# Patient Record
Sex: MALE | Race: WHITE | NOT HISPANIC OR LATINO | Employment: OTHER | URBAN - METROPOLITAN AREA
[De-identification: names, ages, dates, MRNs, and addresses within clinical notes are randomized per-mention and may not be internally consistent; named-entity substitution may affect disease eponyms.]

---

## 2024-01-01 ENCOUNTER — HOSPITAL ENCOUNTER (INPATIENT)
Facility: HOSPITAL | Age: 79
LOS: 14 days | End: 2024-07-22
Attending: EMERGENCY MEDICINE | Admitting: INTERNAL MEDICINE
Payer: COMMERCIAL

## 2024-01-01 VITALS
SYSTOLIC BLOOD PRESSURE: 108 MMHG | WEIGHT: 117.28 LBS | TEMPERATURE: 98.4 F | HEIGHT: 71 IN | BODY MASS INDEX: 16.42 KG/M2 | DIASTOLIC BLOOD PRESSURE: 52 MMHG

## 2024-01-01 DIAGNOSIS — F03.911 DEMENTIA WITH AGITATION, UNSPECIFIED DEMENTIA SEVERITY, UNSPECIFIED DEMENTIA TYPE: Primary | ICD-10-CM

## 2024-01-01 LAB
ALBUMIN SERPL-MCNC: 3.7 G/DL (ref 3.5–5.2)
ALBUMIN/GLOB SERPL: 1.4 G/DL
ALP SERPL-CCNC: 89 U/L (ref 39–117)
ALT SERPL W P-5'-P-CCNC: 20 U/L (ref 1–41)
ANION GAP SERPL CALCULATED.3IONS-SCNC: 10.9 MMOL/L (ref 5–15)
ANION GAP SERPL CALCULATED.3IONS-SCNC: 11 MMOL/L (ref 5–15)
AST SERPL-CCNC: 24 U/L (ref 1–40)
BASOPHILS # BLD AUTO: 0.02 10*3/MM3 (ref 0–0.2)
BASOPHILS NFR BLD AUTO: 0.3 % (ref 0–1.5)
BILIRUB SERPL-MCNC: 0.5 MG/DL (ref 0–1.2)
BILIRUB UR QL STRIP: NEGATIVE
BUN SERPL-MCNC: 20 MG/DL (ref 8–23)
BUN SERPL-MCNC: 24 MG/DL (ref 8–23)
BUN/CREAT SERPL: 25.6 (ref 7–25)
BUN/CREAT SERPL: 28.9 (ref 7–25)
CALCIUM SPEC-SCNC: 9.1 MG/DL (ref 8.6–10.5)
CALCIUM SPEC-SCNC: 9.7 MG/DL (ref 8.6–10.5)
CHLORIDE SERPL-SCNC: 103 MMOL/L (ref 98–107)
CHLORIDE SERPL-SCNC: 103 MMOL/L (ref 98–107)
CLARITY UR: CLEAR
CO2 SERPL-SCNC: 27.1 MMOL/L (ref 22–29)
CO2 SERPL-SCNC: 28 MMOL/L (ref 22–29)
COLOR UR: YELLOW
CREAT SERPL-MCNC: 0.78 MG/DL (ref 0.76–1.27)
CREAT SERPL-MCNC: 0.83 MG/DL (ref 0.76–1.27)
DEPRECATED RDW RBC AUTO: 42.3 FL (ref 37–54)
DEPRECATED RDW RBC AUTO: 42.7 FL (ref 37–54)
EGFRCR SERPLBLD CKD-EPI 2021: 87.9 ML/MIN/1.73
EGFRCR SERPLBLD CKD-EPI 2021: 89.6 ML/MIN/1.73
EOSINOPHIL # BLD AUTO: 0.06 10*3/MM3 (ref 0–0.4)
EOSINOPHIL NFR BLD AUTO: 1 % (ref 0.3–6.2)
ERYTHROCYTE [DISTWIDTH] IN BLOOD BY AUTOMATED COUNT: 12.2 % (ref 12.3–15.4)
ERYTHROCYTE [DISTWIDTH] IN BLOOD BY AUTOMATED COUNT: 12.3 % (ref 12.3–15.4)
GLOBULIN UR ELPH-MCNC: 2.7 GM/DL
GLUCOSE BLDC GLUCOMTR-MCNC: 94 MG/DL (ref 70–130)
GLUCOSE SERPL-MCNC: 77 MG/DL (ref 65–99)
GLUCOSE SERPL-MCNC: 80 MG/DL (ref 65–99)
GLUCOSE UR STRIP-MCNC: NEGATIVE MG/DL
HCT VFR BLD AUTO: 35.1 % (ref 37.5–51)
HCT VFR BLD AUTO: 39.1 % (ref 37.5–51)
HGB BLD-MCNC: 11.7 G/DL (ref 13–17.7)
HGB BLD-MCNC: 13.1 G/DL (ref 13–17.7)
HGB UR QL STRIP.AUTO: NEGATIVE
IMM GRANULOCYTES # BLD AUTO: 0.02 10*3/MM3 (ref 0–0.05)
IMM GRANULOCYTES NFR BLD AUTO: 0.3 % (ref 0–0.5)
KETONES UR QL STRIP: NEGATIVE
LEUKOCYTE ESTERASE UR QL STRIP.AUTO: NEGATIVE
LYMPHOCYTES # BLD AUTO: 1.41 10*3/MM3 (ref 0.7–3.1)
LYMPHOCYTES NFR BLD AUTO: 23.3 % (ref 19.6–45.3)
MCH RBC QN AUTO: 31.5 PG (ref 26.6–33)
MCH RBC QN AUTO: 31.6 PG (ref 26.6–33)
MCHC RBC AUTO-ENTMCNC: 33.3 G/DL (ref 31.5–35.7)
MCHC RBC AUTO-ENTMCNC: 33.5 G/DL (ref 31.5–35.7)
MCV RBC AUTO: 94.4 FL (ref 79–97)
MCV RBC AUTO: 94.6 FL (ref 79–97)
MONOCYTES # BLD AUTO: 0.49 10*3/MM3 (ref 0.1–0.9)
MONOCYTES NFR BLD AUTO: 8.1 % (ref 5–12)
NEUTROPHILS NFR BLD AUTO: 4.06 10*3/MM3 (ref 1.7–7)
NEUTROPHILS NFR BLD AUTO: 67 % (ref 42.7–76)
NITRITE UR QL STRIP: NEGATIVE
NRBC BLD AUTO-RTO: 0 /100 WBC (ref 0–0.2)
PH UR STRIP.AUTO: 7 [PH] (ref 5–8)
PLATELET # BLD AUTO: 234 10*3/MM3 (ref 140–450)
PLATELET # BLD AUTO: 239 10*3/MM3 (ref 140–450)
PMV BLD AUTO: 8.9 FL (ref 6–12)
PMV BLD AUTO: 9 FL (ref 6–12)
POTASSIUM SERPL-SCNC: 4 MMOL/L (ref 3.5–5.2)
POTASSIUM SERPL-SCNC: 4.2 MMOL/L (ref 3.5–5.2)
PROT SERPL-MCNC: 6.4 G/DL (ref 6–8.5)
PROT UR QL STRIP: ABNORMAL
RBC # BLD AUTO: 3.71 10*6/MM3 (ref 4.14–5.8)
RBC # BLD AUTO: 4.14 10*6/MM3 (ref 4.14–5.8)
SODIUM SERPL-SCNC: 141 MMOL/L (ref 136–145)
SODIUM SERPL-SCNC: 142 MMOL/L (ref 136–145)
SP GR UR STRIP: 1.01 (ref 1–1.03)
UROBILINOGEN UR QL STRIP: ABNORMAL
WBC NRBC COR # BLD AUTO: 5.74 10*3/MM3 (ref 3.4–10.8)
WBC NRBC COR # BLD AUTO: 6.06 10*3/MM3 (ref 3.4–10.8)

## 2024-01-01 PROCEDURE — 96372 THER/PROPH/DIAG INJ SC/IM: CPT

## 2024-01-01 PROCEDURE — 25010000002 KETOROLAC TROMETHAMINE PER 15 MG: Performed by: INTERNAL MEDICINE

## 2024-01-01 PROCEDURE — 82948 REAGENT STRIP/BLOOD GLUCOSE: CPT

## 2024-01-01 PROCEDURE — 25010000002 LORAZEPAM PER 2 MG: Performed by: INTERNAL MEDICINE

## 2024-01-01 PROCEDURE — 25010000002 OLANZAPINE 10 MG RECONSTITUTED SOLUTION: Performed by: SPECIALIST

## 2024-01-01 PROCEDURE — 99222 1ST HOSP IP/OBS MODERATE 55: CPT | Performed by: INTERNAL MEDICINE

## 2024-01-01 PROCEDURE — 80053 COMPREHEN METABOLIC PANEL: CPT | Performed by: EMERGENCY MEDICINE

## 2024-01-01 PROCEDURE — 25010000002 GLYCOPYRROLATE 0.2 MG/ML SOLUTION: Performed by: INTERNAL MEDICINE

## 2024-01-01 PROCEDURE — 80048 BASIC METABOLIC PNL TOTAL CA: CPT | Performed by: NURSE PRACTITIONER

## 2024-01-01 PROCEDURE — 85027 COMPLETE CBC AUTOMATED: CPT | Performed by: NURSE PRACTITIONER

## 2024-01-01 PROCEDURE — 25010000002 HYDROMORPHONE 1 MG/ML SOLUTION: Performed by: INTERNAL MEDICINE

## 2024-01-01 PROCEDURE — 25010000002 MORPHINE PER 10 MG: Performed by: INTERNAL MEDICINE

## 2024-01-01 PROCEDURE — 99285 EMERGENCY DEPT VISIT HI MDM: CPT

## 2024-01-01 PROCEDURE — 99231 SBSQ HOSP IP/OBS SF/LOW 25: CPT | Performed by: INTERNAL MEDICINE

## 2024-01-01 PROCEDURE — 25010000002 HALOPERIDOL LACTATE PER 5 MG: Performed by: INTERNAL MEDICINE

## 2024-01-01 PROCEDURE — 25010000002 OLANZAPINE 10 MG RECONSTITUTED SOLUTION: Performed by: EMERGENCY MEDICINE

## 2024-01-01 PROCEDURE — 81003 URINALYSIS AUTO W/O SCOPE: CPT | Performed by: EMERGENCY MEDICINE

## 2024-01-01 PROCEDURE — 85025 COMPLETE CBC W/AUTO DIFF WBC: CPT | Performed by: EMERGENCY MEDICINE

## 2024-01-01 PROCEDURE — G0378 HOSPITAL OBSERVATION PER HR: HCPCS

## 2024-01-01 PROCEDURE — 25010000002 HYDROMORPHONE PER 4 MG: Performed by: INTERNAL MEDICINE

## 2024-01-01 RX ORDER — MORPHINE SULFATE 2 MG/ML
2 INJECTION, SOLUTION INTRAMUSCULAR; INTRAVENOUS
Status: DISCONTINUED | OUTPATIENT
Start: 2024-01-01 | End: 2024-01-01

## 2024-01-01 RX ORDER — HALOPERIDOL 2 MG/1
2 TABLET ORAL EVERY 4 HOURS PRN
Status: DISCONTINUED | OUTPATIENT
Start: 2024-01-01 | End: 2024-01-01

## 2024-01-01 RX ORDER — ACETAMINOPHEN 325 MG/1
650 TABLET ORAL EVERY 4 HOURS PRN
Status: DISCONTINUED | OUTPATIENT
Start: 2024-01-01 | End: 2024-01-01

## 2024-01-01 RX ORDER — HALOPERIDOL 2 MG/ML
2 SOLUTION ORAL EVERY 4 HOURS PRN
Status: DISCONTINUED | OUTPATIENT
Start: 2024-01-01 | End: 2024-01-01

## 2024-01-01 RX ORDER — HALOPERIDOL 5 MG/ML
1 INJECTION INTRAMUSCULAR ONCE
Status: COMPLETED | OUTPATIENT
Start: 2024-01-01 | End: 2024-01-01

## 2024-01-01 RX ORDER — CITALOPRAM HYDROBROMIDE 10 MG/1
10 TABLET ORAL DAILY
Status: DISCONTINUED | OUTPATIENT
Start: 2024-01-01 | End: 2024-01-01

## 2024-01-01 RX ORDER — LORAZEPAM 2 MG/ML
1 CONCENTRATE ORAL
Status: DISCONTINUED | OUTPATIENT
Start: 2024-01-01 | End: 2024-01-01

## 2024-01-01 RX ORDER — SENNOSIDES A AND B 8.6 MG/1
2 TABLET, FILM COATED ORAL DAILY
COMMUNITY

## 2024-01-01 RX ORDER — LORAZEPAM 2 MG/ML
1 CONCENTRATE ORAL
Status: DISCONTINUED | OUTPATIENT
Start: 2024-01-01 | End: 2024-01-01 | Stop reason: HOSPADM

## 2024-01-01 RX ORDER — LISINOPRIL 20 MG/1
20 TABLET ORAL DAILY
COMMUNITY

## 2024-01-01 RX ORDER — LIDOCAINE HYDROCHLORIDE 20 MG/ML
JELLY TOPICAL ONCE
Status: COMPLETED | OUTPATIENT
Start: 2024-01-01 | End: 2024-01-01

## 2024-01-01 RX ORDER — LORAZEPAM 2 MG/ML
0.5 CONCENTRATE ORAL
Status: DISCONTINUED | OUTPATIENT
Start: 2024-01-01 | End: 2024-01-01 | Stop reason: HOSPADM

## 2024-01-01 RX ORDER — MORPHINE SULFATE 10 MG/ML
6 INJECTION INTRAMUSCULAR; INTRAVENOUS; SUBCUTANEOUS
Status: DISCONTINUED | OUTPATIENT
Start: 2024-01-01 | End: 2024-01-01 | Stop reason: HOSPADM

## 2024-01-01 RX ORDER — LORAZEPAM 2 MG/ML
2 CONCENTRATE ORAL
Status: DISCONTINUED | OUTPATIENT
Start: 2024-01-01 | End: 2024-01-01

## 2024-01-01 RX ORDER — ONDANSETRON 2 MG/ML
4 INJECTION INTRAMUSCULAR; INTRAVENOUS EVERY 6 HOURS PRN
Status: DISCONTINUED | OUTPATIENT
Start: 2024-01-01 | End: 2024-01-01 | Stop reason: HOSPADM

## 2024-01-01 RX ORDER — MORPHINE SULFATE 20 MG/ML
20 SOLUTION ORAL
Status: DISCONTINUED | OUTPATIENT
Start: 2024-01-01 | End: 2024-01-01

## 2024-01-01 RX ORDER — LORAZEPAM 2 MG/ML
0.5 INJECTION INTRAMUSCULAR
Status: DISCONTINUED | OUTPATIENT
Start: 2024-01-01 | End: 2024-01-01 | Stop reason: HOSPADM

## 2024-01-01 RX ORDER — MIRTAZAPINE 15 MG/1
15 TABLET, ORALLY DISINTEGRATING ORAL NIGHTLY
Status: DISCONTINUED | OUTPATIENT
Start: 2024-01-01 | End: 2024-01-01

## 2024-01-01 RX ORDER — OLANZAPINE 2.5 MG/1
2.5 TABLET, FILM COATED ORAL 2 TIMES DAILY
Status: DISCONTINUED | OUTPATIENT
Start: 2024-01-01 | End: 2024-01-01

## 2024-01-01 RX ORDER — MORPHINE SULFATE 2 MG/ML
2 INJECTION, SOLUTION INTRAMUSCULAR; INTRAVENOUS EVERY 4 HOURS PRN
Status: DISCONTINUED | OUTPATIENT
Start: 2024-01-01 | End: 2024-01-01

## 2024-01-01 RX ORDER — CALCIUM CARBONATE 500 MG/1
2 TABLET, CHEWABLE ORAL 2 TIMES DAILY PRN
Status: DISCONTINUED | OUTPATIENT
Start: 2024-01-01 | End: 2024-01-01

## 2024-01-01 RX ORDER — ACETAMINOPHEN 160 MG/5ML
650 SOLUTION ORAL EVERY 4 HOURS PRN
Status: DISCONTINUED | OUTPATIENT
Start: 2024-01-01 | End: 2024-01-01

## 2024-01-01 RX ORDER — SODIUM CHLORIDE 0.9 % (FLUSH) 0.9 %
10 SYRINGE (ML) INJECTION EVERY 12 HOURS SCHEDULED
Status: DISCONTINUED | OUTPATIENT
Start: 2024-01-01 | End: 2024-01-01

## 2024-01-01 RX ORDER — HALOPERIDOL 1 MG/1
1 TABLET ORAL EVERY 4 HOURS PRN
Status: DISCONTINUED | OUTPATIENT
Start: 2024-01-01 | End: 2024-01-01

## 2024-01-01 RX ORDER — HYDROMORPHONE HYDROCHLORIDE 1 MG/ML
0.5 INJECTION, SOLUTION INTRAMUSCULAR; INTRAVENOUS; SUBCUTANEOUS
Status: DISCONTINUED | OUTPATIENT
Start: 2024-01-01 | End: 2024-01-01 | Stop reason: HOSPADM

## 2024-01-01 RX ORDER — LORAZEPAM 0.5 MG/1
0.5 TABLET ORAL NIGHTLY
COMMUNITY

## 2024-01-01 RX ORDER — HALOPERIDOL 2 MG/ML
1 SOLUTION ORAL EVERY 4 HOURS PRN
Status: DISCONTINUED | OUTPATIENT
Start: 2024-01-01 | End: 2024-01-01

## 2024-01-01 RX ORDER — TAMSULOSIN HYDROCHLORIDE 0.4 MG/1
0.4 CAPSULE ORAL DAILY
Status: DISCONTINUED | OUTPATIENT
Start: 2024-01-01 | End: 2024-01-01

## 2024-01-01 RX ORDER — GLYCOPYRROLATE 0.2 MG/ML
0.2 INJECTION INTRAMUSCULAR; INTRAVENOUS
Status: DISCONTINUED | OUTPATIENT
Start: 2024-01-01 | End: 2024-01-01 | Stop reason: HOSPADM

## 2024-01-01 RX ORDER — KETOROLAC TROMETHAMINE 15 MG/ML
15 INJECTION, SOLUTION INTRAMUSCULAR; INTRAVENOUS EVERY 6 HOURS PRN
Status: DISCONTINUED | OUTPATIENT
Start: 2024-01-01 | End: 2024-01-01

## 2024-01-01 RX ORDER — MORPHINE SULFATE 4 MG/ML
4 INJECTION, SOLUTION INTRAMUSCULAR; INTRAVENOUS
Status: DISCONTINUED | OUTPATIENT
Start: 2024-01-01 | End: 2024-01-01 | Stop reason: HOSPADM

## 2024-01-01 RX ORDER — CEFDINIR 300 MG/1
300 CAPSULE ORAL 2 TIMES DAILY
COMMUNITY

## 2024-01-01 RX ORDER — DIPHENOXYLATE HYDROCHLORIDE AND ATROPINE SULFATE 2.5; .025 MG/1; MG/1
1 TABLET ORAL
Status: DISCONTINUED | OUTPATIENT
Start: 2024-01-01 | End: 2024-01-01

## 2024-01-01 RX ORDER — MORPHINE SULFATE 20 MG/ML
5 SOLUTION ORAL
Status: DISCONTINUED | OUTPATIENT
Start: 2024-01-01 | End: 2024-01-01 | Stop reason: HOSPADM

## 2024-01-01 RX ORDER — LORAZEPAM 2 MG/ML
2 INJECTION INTRAMUSCULAR
Status: DISCONTINUED | OUTPATIENT
Start: 2024-01-01 | End: 2024-01-01 | Stop reason: HOSPADM

## 2024-01-01 RX ORDER — LORAZEPAM 2 MG/ML
2 CONCENTRATE ORAL
Status: DISCONTINUED | OUTPATIENT
Start: 2024-01-01 | End: 2024-01-01 | Stop reason: HOSPADM

## 2024-01-01 RX ORDER — OLANZAPINE 10 MG/2ML
5 INJECTION, POWDER, FOR SOLUTION INTRAMUSCULAR EVERY 8 HOURS PRN
Status: DISCONTINUED | OUTPATIENT
Start: 2024-01-01 | End: 2024-01-01

## 2024-01-01 RX ORDER — BISACODYL 10 MG
10 SUPPOSITORY, RECTAL RECTAL DAILY PRN
Status: DISCONTINUED | OUTPATIENT
Start: 2024-01-01 | End: 2024-01-01 | Stop reason: HOSPADM

## 2024-01-01 RX ORDER — DIVALPROEX SODIUM 125 MG/1
250 CAPSULE, COATED PELLETS ORAL 3 TIMES DAILY
Status: DISCONTINUED | OUTPATIENT
Start: 2024-01-01 | End: 2024-01-01

## 2024-01-01 RX ORDER — ONDANSETRON 4 MG/1
4 TABLET, ORALLY DISINTEGRATING ORAL EVERY 6 HOURS PRN
Status: DISCONTINUED | OUTPATIENT
Start: 2024-01-01 | End: 2024-01-01

## 2024-01-01 RX ORDER — LORAZEPAM 2 MG/ML
0.5 CONCENTRATE ORAL
Status: DISCONTINUED | OUTPATIENT
Start: 2024-01-01 | End: 2024-01-01

## 2024-01-01 RX ORDER — GLYCOPYRROLATE 0.2 MG/ML
0.2 INJECTION INTRAMUSCULAR; INTRAVENOUS
Status: DISCONTINUED | OUTPATIENT
Start: 2024-01-01 | End: 2024-01-01

## 2024-01-01 RX ORDER — MORPHINE SULFATE 2 MG/ML
2 INJECTION, SOLUTION INTRAMUSCULAR; INTRAVENOUS
Status: DISCONTINUED | OUTPATIENT
Start: 2024-01-01 | End: 2024-01-01 | Stop reason: HOSPADM

## 2024-01-01 RX ORDER — SODIUM CHLORIDE 0.9 % (FLUSH) 0.9 %
10 SYRINGE (ML) INJECTION AS NEEDED
Status: DISCONTINUED | OUTPATIENT
Start: 2024-01-01 | End: 2024-01-01 | Stop reason: HOSPADM

## 2024-01-01 RX ORDER — ACETAMINOPHEN 650 MG/1
650 SUPPOSITORY RECTAL EVERY 4 HOURS PRN
Status: DISCONTINUED | OUTPATIENT
Start: 2024-01-01 | End: 2024-01-01 | Stop reason: HOSPADM

## 2024-01-01 RX ORDER — HYDROCODONE BITARTRATE AND ACETAMINOPHEN 5; 325 MG/1; MG/1
1 TABLET ORAL EVERY 4 HOURS PRN
Status: DISCONTINUED | OUTPATIENT
Start: 2024-01-01 | End: 2024-01-01

## 2024-01-01 RX ORDER — LORAZEPAM 0.5 MG/1
0.5 TABLET ORAL NIGHTLY
Status: DISCONTINUED | OUTPATIENT
Start: 2024-01-01 | End: 2024-01-01

## 2024-01-01 RX ORDER — LORAZEPAM 0.5 MG/1
0.5 TABLET ORAL 3 TIMES DAILY
Status: DISCONTINUED | OUTPATIENT
Start: 2024-01-01 | End: 2024-01-01

## 2024-01-01 RX ORDER — MORPHINE SULFATE 10 MG/ML
6 INJECTION INTRAMUSCULAR; INTRAVENOUS; SUBCUTANEOUS
Status: DISCONTINUED | OUTPATIENT
Start: 2024-01-01 | End: 2024-01-01

## 2024-01-01 RX ORDER — LORAZEPAM 0.5 MG/1
0.5 TABLET ORAL
Status: DISCONTINUED | OUTPATIENT
Start: 2024-01-01 | End: 2024-01-01

## 2024-01-01 RX ORDER — DIVALPROEX SODIUM 125 MG/1
125 CAPSULE, COATED PELLETS ORAL 3 TIMES DAILY
Status: DISCONTINUED | OUTPATIENT
Start: 2024-01-01 | End: 2024-01-01

## 2024-01-01 RX ORDER — LORAZEPAM 2 MG/ML
0.5 INJECTION INTRAMUSCULAR
Status: DISCONTINUED | OUTPATIENT
Start: 2024-01-01 | End: 2024-01-01

## 2024-01-01 RX ORDER — SODIUM CHLORIDE 9 MG/ML
40 INJECTION, SOLUTION INTRAVENOUS AS NEEDED
Status: DISCONTINUED | OUTPATIENT
Start: 2024-01-01 | End: 2024-01-01

## 2024-01-01 RX ORDER — MORPHINE SULFATE 20 MG/ML
10 SOLUTION ORAL
Status: DISCONTINUED | OUTPATIENT
Start: 2024-01-01 | End: 2024-01-01

## 2024-01-01 RX ORDER — HYDROMORPHONE HYDROCHLORIDE 2 MG/ML
1.5 INJECTION, SOLUTION INTRAMUSCULAR; INTRAVENOUS; SUBCUTANEOUS
Status: DISCONTINUED | OUTPATIENT
Start: 2024-01-01 | End: 2024-01-01 | Stop reason: HOSPADM

## 2024-01-01 RX ORDER — LORAZEPAM 2 MG/ML
1 INJECTION INTRAMUSCULAR
Status: DISCONTINUED | OUTPATIENT
Start: 2024-01-01 | End: 2024-01-01

## 2024-01-01 RX ORDER — MORPHINE SULFATE 20 MG/ML
20 SOLUTION ORAL
Status: DISCONTINUED | OUTPATIENT
Start: 2024-01-01 | End: 2024-01-01 | Stop reason: HOSPADM

## 2024-01-01 RX ORDER — GLYCOPYRROLATE 0.2 MG/ML
0.4 INJECTION INTRAMUSCULAR; INTRAVENOUS
Status: DISCONTINUED | OUTPATIENT
Start: 2024-01-01 | End: 2024-01-01

## 2024-01-01 RX ORDER — MEMANTINE HYDROCHLORIDE 5 MG/1
5 TABLET ORAL 2 TIMES DAILY
Status: DISCONTINUED | OUTPATIENT
Start: 2024-01-01 | End: 2024-01-01

## 2024-01-01 RX ORDER — LORAZEPAM 2 MG/ML
2 INJECTION INTRAMUSCULAR
Status: DISCONTINUED | OUTPATIENT
Start: 2024-01-01 | End: 2024-01-01

## 2024-01-01 RX ORDER — HYDROMORPHONE HYDROCHLORIDE 1 MG/ML
0.5 INJECTION, SOLUTION INTRAMUSCULAR; INTRAVENOUS; SUBCUTANEOUS
Status: DISCONTINUED | OUTPATIENT
Start: 2024-01-01 | End: 2024-01-01

## 2024-01-01 RX ORDER — POLYETHYLENE GLYCOL 3350 17 G/17G
17 POWDER, FOR SOLUTION ORAL DAILY PRN
Status: DISCONTINUED | OUTPATIENT
Start: 2024-01-01 | End: 2024-01-01

## 2024-01-01 RX ORDER — BISACODYL 5 MG/1
5 TABLET, DELAYED RELEASE ORAL DAILY PRN
Status: DISCONTINUED | OUTPATIENT
Start: 2024-01-01 | End: 2024-01-01

## 2024-01-01 RX ORDER — LISINOPRIL 20 MG/1
20 TABLET ORAL DAILY
Status: DISCONTINUED | OUTPATIENT
Start: 2024-01-01 | End: 2024-01-01

## 2024-01-01 RX ORDER — OLANZAPINE 10 MG/2ML
2.5 INJECTION, POWDER, FOR SOLUTION INTRAMUSCULAR ONCE
Status: COMPLETED | OUTPATIENT
Start: 2024-01-01 | End: 2024-01-01

## 2024-01-01 RX ORDER — HYDROMORPHONE HYDROCHLORIDE 2 MG/ML
1.5 INJECTION, SOLUTION INTRAMUSCULAR; INTRAVENOUS; SUBCUTANEOUS
Status: DISCONTINUED | OUTPATIENT
Start: 2024-01-01 | End: 2024-01-01

## 2024-01-01 RX ORDER — MORPHINE SULFATE 20 MG/ML
5 SOLUTION ORAL
Status: DISCONTINUED | OUTPATIENT
Start: 2024-01-01 | End: 2024-01-01

## 2024-01-01 RX ORDER — MEMANTINE HYDROCHLORIDE 5 MG/1
5 TABLET ORAL 2 TIMES DAILY
COMMUNITY

## 2024-01-01 RX ORDER — GLYCOPYRROLATE 0.2 MG/ML
0.4 INJECTION INTRAMUSCULAR; INTRAVENOUS
Status: DISCONTINUED | OUTPATIENT
Start: 2024-01-01 | End: 2024-01-01 | Stop reason: HOSPADM

## 2024-01-01 RX ORDER — MORPHINE SULFATE 4 MG/ML
4 INJECTION, SOLUTION INTRAMUSCULAR; INTRAVENOUS
Status: DISCONTINUED | OUTPATIENT
Start: 2024-01-01 | End: 2024-01-01

## 2024-01-01 RX ORDER — CEFDINIR 300 MG/1
300 CAPSULE ORAL 2 TIMES DAILY
Status: DISCONTINUED | OUTPATIENT
Start: 2024-01-01 | End: 2024-01-01

## 2024-01-01 RX ORDER — HALOPERIDOL 5 MG/ML
1 INJECTION INTRAMUSCULAR EVERY 4 HOURS PRN
Status: DISCONTINUED | OUTPATIENT
Start: 2024-01-01 | End: 2024-01-01

## 2024-01-01 RX ORDER — ACETAMINOPHEN 650 MG/1
650 SUPPOSITORY RECTAL EVERY 4 HOURS PRN
Status: DISCONTINUED | OUTPATIENT
Start: 2024-01-01 | End: 2024-01-01

## 2024-01-01 RX ORDER — HALOPERIDOL 5 MG/ML
2 INJECTION INTRAMUSCULAR EVERY 4 HOURS PRN
Status: DISCONTINUED | OUTPATIENT
Start: 2024-01-01 | End: 2024-01-01

## 2024-01-01 RX ORDER — MORPHINE SULFATE 20 MG/ML
10 SOLUTION ORAL
Status: DISCONTINUED | OUTPATIENT
Start: 2024-01-01 | End: 2024-01-01 | Stop reason: HOSPADM

## 2024-01-01 RX ORDER — MIRTAZAPINE 15 MG/1
15 TABLET, ORALLY DISINTEGRATING ORAL NIGHTLY
COMMUNITY

## 2024-01-01 RX ORDER — CITALOPRAM HYDROBROMIDE 10 MG/1
10 TABLET ORAL DAILY
COMMUNITY

## 2024-01-01 RX ORDER — AMOXICILLIN 250 MG
2 CAPSULE ORAL 2 TIMES DAILY PRN
Status: DISCONTINUED | OUTPATIENT
Start: 2024-01-01 | End: 2024-01-01

## 2024-01-01 RX ORDER — LORAZEPAM 2 MG/ML
1 INJECTION INTRAMUSCULAR
Status: DISCONTINUED | OUTPATIENT
Start: 2024-01-01 | End: 2024-01-01 | Stop reason: HOSPADM

## 2024-01-01 RX ADMIN — LORAZEPAM 0.5 MG: 0.5 TABLET ORAL at 21:26

## 2024-01-01 RX ADMIN — CEFDINIR 300 MG: 300 CAPSULE ORAL at 09:12

## 2024-01-01 RX ADMIN — LORAZEPAM 1 MG: 2 INJECTION INTRAMUSCULAR; INTRAVENOUS at 00:50

## 2024-01-01 RX ADMIN — OLANZAPINE 5 MG: 10 INJECTION, POWDER, FOR SOLUTION INTRAMUSCULAR at 00:12

## 2024-01-01 RX ADMIN — GLYCOPYRROLATE 0.2 MG: 0.2 INJECTION INTRAMUSCULAR; INTRAVENOUS at 21:17

## 2024-01-01 RX ADMIN — MIRTAZAPINE 15 MG: 15 TABLET, ORALLY DISINTEGRATING ORAL at 20:47

## 2024-01-01 RX ADMIN — KETOROLAC TROMETHAMINE 15 MG: 15 INJECTION, SOLUTION INTRAMUSCULAR; INTRAVENOUS at 11:51

## 2024-01-01 RX ADMIN — LORAZEPAM 2 MG: 2 INJECTION INTRAMUSCULAR; INTRAVENOUS at 14:49

## 2024-01-01 RX ADMIN — LORAZEPAM 0.5 MG: 0.5 TABLET ORAL at 23:00

## 2024-01-01 RX ADMIN — CEFDINIR 300 MG: 300 CAPSULE ORAL at 21:21

## 2024-01-01 RX ADMIN — DIVALPROEX SODIUM 250 MG: 125 CAPSULE, COATED PELLETS ORAL at 20:44

## 2024-01-01 RX ADMIN — Medication 10 ML: at 21:26

## 2024-01-01 RX ADMIN — OLANZAPINE 2.5 MG: 2.5 TABLET, FILM COATED ORAL at 22:43

## 2024-01-01 RX ADMIN — CEFDINIR 300 MG: 300 CAPSULE ORAL at 20:47

## 2024-01-01 RX ADMIN — CITALOPRAM 10 MG: 10 TABLET, FILM COATED ORAL at 08:45

## 2024-01-01 RX ADMIN — Medication 10 ML: at 09:20

## 2024-01-01 RX ADMIN — MORPHINE SULFATE 4 MG: 4 INJECTION, SOLUTION INTRAMUSCULAR; INTRAVENOUS at 12:34

## 2024-01-01 RX ADMIN — MEMANTINE HYDROCHLORIDE 5 MG: 5 TABLET, FILM COATED ORAL at 21:26

## 2024-01-01 RX ADMIN — HYDROMORPHONE HYDROCHLORIDE 1 MG: 1 INJECTION, SOLUTION INTRAMUSCULAR; INTRAVENOUS; SUBCUTANEOUS at 21:17

## 2024-01-01 RX ADMIN — LORAZEPAM 1 MG: 2 INJECTION INTRAMUSCULAR; INTRAVENOUS at 04:58

## 2024-01-01 RX ADMIN — DIVALPROEX SODIUM 250 MG: 125 CAPSULE, COATED PELLETS ORAL at 22:43

## 2024-01-01 RX ADMIN — MIRTAZAPINE 15 MG: 15 TABLET, ORALLY DISINTEGRATING ORAL at 21:22

## 2024-01-01 RX ADMIN — MEMANTINE HYDROCHLORIDE 5 MG: 5 TABLET, FILM COATED ORAL at 20:47

## 2024-01-01 RX ADMIN — MORPHINE SULFATE 4 MG: 4 INJECTION, SOLUTION INTRAMUSCULAR; INTRAVENOUS at 17:27

## 2024-01-01 RX ADMIN — OLANZAPINE 2.5 MG: 2.5 TABLET, FILM COATED ORAL at 20:45

## 2024-01-01 RX ADMIN — GLYCOPYRROLATE 0.2 MG: 0.2 INJECTION INTRAMUSCULAR; INTRAVENOUS at 01:07

## 2024-01-01 RX ADMIN — DIVALPROEX SODIUM 250 MG: 125 CAPSULE, COATED PELLETS ORAL at 21:26

## 2024-01-01 RX ADMIN — LISINOPRIL 20 MG: 20 TABLET ORAL at 08:34

## 2024-01-01 RX ADMIN — OLANZAPINE 2.5 MG: 2.5 TABLET, FILM COATED ORAL at 09:14

## 2024-01-01 RX ADMIN — HYDROMORPHONE HYDROCHLORIDE 1 MG: 1 INJECTION, SOLUTION INTRAMUSCULAR; INTRAVENOUS; SUBCUTANEOUS at 01:07

## 2024-01-01 RX ADMIN — DIVALPROEX SODIUM 250 MG: 125 CAPSULE, COATED PELLETS ORAL at 15:20

## 2024-01-01 RX ADMIN — LORAZEPAM 0.5 MG: 0.5 TABLET ORAL at 10:38

## 2024-01-01 RX ADMIN — KETOROLAC TROMETHAMINE 15 MG: 15 INJECTION, SOLUTION INTRAMUSCULAR; INTRAVENOUS at 23:10

## 2024-01-01 RX ADMIN — LORAZEPAM 2 MG: 2 INJECTION INTRAMUSCULAR; INTRAVENOUS at 12:11

## 2024-01-01 RX ADMIN — MORPHINE SULFATE 2 MG: 2 INJECTION, SOLUTION INTRAMUSCULAR; INTRAVENOUS at 17:47

## 2024-01-01 RX ADMIN — Medication 10 ML: at 09:13

## 2024-01-01 RX ADMIN — LORAZEPAM 0.5 MG: 2 INJECTION INTRAMUSCULAR; INTRAVENOUS at 21:59

## 2024-01-01 RX ADMIN — OLANZAPINE 2.5 MG: 2.5 TABLET, FILM COATED ORAL at 15:07

## 2024-01-01 RX ADMIN — LORAZEPAM 0.5 MG: 0.5 TABLET ORAL at 16:35

## 2024-01-01 RX ADMIN — OLANZAPINE 2.5 MG: 10 INJECTION, POWDER, FOR SOLUTION INTRAMUSCULAR at 19:05

## 2024-01-01 RX ADMIN — Medication 10 ML: at 18:40

## 2024-01-01 RX ADMIN — MEMANTINE HYDROCHLORIDE 5 MG: 5 TABLET, FILM COATED ORAL at 22:56

## 2024-01-01 RX ADMIN — MORPHINE SULFATE 2 MG: 2 INJECTION, SOLUTION INTRAMUSCULAR; INTRAVENOUS at 15:38

## 2024-01-01 RX ADMIN — TAMSULOSIN HYDROCHLORIDE 0.4 MG: 0.4 CAPSULE ORAL at 09:12

## 2024-01-01 RX ADMIN — MEMANTINE HYDROCHLORIDE 5 MG: 5 TABLET, FILM COATED ORAL at 08:34

## 2024-01-01 RX ADMIN — MIRTAZAPINE 15 MG: 15 TABLET, ORALLY DISINTEGRATING ORAL at 22:59

## 2024-01-01 RX ADMIN — Medication 10 ML: at 20:47

## 2024-01-01 RX ADMIN — MORPHINE SULFATE 2 MG: 2 INJECTION, SOLUTION INTRAMUSCULAR; INTRAVENOUS at 13:10

## 2024-01-01 RX ADMIN — MORPHINE SULFATE 2 MG: 2 INJECTION, SOLUTION INTRAMUSCULAR; INTRAVENOUS at 18:33

## 2024-01-01 RX ADMIN — MORPHINE SULFATE 2 MG: 2 INJECTION, SOLUTION INTRAMUSCULAR; INTRAVENOUS at 16:57

## 2024-01-01 RX ADMIN — LORAZEPAM 1 MG: 2 INJECTION INTRAMUSCULAR; INTRAVENOUS at 08:20

## 2024-01-01 RX ADMIN — MIRTAZAPINE 15 MG: 15 TABLET, ORALLY DISINTEGRATING ORAL at 22:43

## 2024-01-01 RX ADMIN — MORPHINE SULFATE 2 MG: 2 INJECTION, SOLUTION INTRAMUSCULAR; INTRAVENOUS at 19:34

## 2024-01-01 RX ADMIN — GLYCOPYRROLATE 0.2 MG: 0.2 INJECTION INTRAMUSCULAR; INTRAVENOUS at 00:43

## 2024-01-01 RX ADMIN — MIRTAZAPINE 15 MG: 15 TABLET, ORALLY DISINTEGRATING ORAL at 02:12

## 2024-01-01 RX ADMIN — OLANZAPINE 5 MG: 10 INJECTION, POWDER, FOR SOLUTION INTRAMUSCULAR at 14:57

## 2024-01-01 RX ADMIN — DIVALPROEX SODIUM 250 MG: 125 CAPSULE, COATED PELLETS ORAL at 10:38

## 2024-01-01 RX ADMIN — HALOPERIDOL LACTATE 1 MG: 5 INJECTION, SOLUTION INTRAMUSCULAR at 21:34

## 2024-01-01 RX ADMIN — LORAZEPAM 0.5 MG: 0.5 TABLET ORAL at 20:45

## 2024-01-01 RX ADMIN — LORAZEPAM 0.5 MG: 0.5 TABLET ORAL at 20:47

## 2024-01-01 RX ADMIN — GLYCOPYRROLATE 0.2 MG: 0.2 INJECTION INTRAMUSCULAR; INTRAVENOUS at 18:33

## 2024-01-01 RX ADMIN — MORPHINE SULFATE 2 MG: 2 INJECTION, SOLUTION INTRAMUSCULAR; INTRAVENOUS at 04:47

## 2024-01-01 RX ADMIN — LORAZEPAM 0.5 MG: 2 INJECTION INTRAMUSCULAR; INTRAVENOUS at 16:58

## 2024-01-01 RX ADMIN — HYDROMORPHONE HYDROCHLORIDE 1 MG: 1 INJECTION, SOLUTION INTRAMUSCULAR; INTRAVENOUS; SUBCUTANEOUS at 18:39

## 2024-01-01 RX ADMIN — DIVALPROEX SODIUM 250 MG: 125 CAPSULE, COATED PELLETS ORAL at 16:35

## 2024-01-01 RX ADMIN — OLANZAPINE 2.5 MG: 2.5 TABLET, FILM COATED ORAL at 23:00

## 2024-01-01 RX ADMIN — CITALOPRAM 10 MG: 10 TABLET, FILM COATED ORAL at 09:20

## 2024-01-01 RX ADMIN — LORAZEPAM 0.5 MG: 0.5 TABLET ORAL at 02:12

## 2024-01-01 RX ADMIN — LORAZEPAM 0.5 MG: 2 INJECTION INTRAMUSCULAR; INTRAVENOUS at 22:57

## 2024-01-01 RX ADMIN — LORAZEPAM 2 MG: 2 INJECTION INTRAMUSCULAR; INTRAVENOUS at 09:52

## 2024-01-01 RX ADMIN — LORAZEPAM 1 MG: 2 INJECTION INTRAMUSCULAR; INTRAVENOUS at 19:34

## 2024-01-01 RX ADMIN — HYDROCODONE BITARTRATE AND ACETAMINOPHEN 1 TABLET: 5; 325 TABLET ORAL at 22:56

## 2024-01-01 RX ADMIN — LORAZEPAM 1 MG: 2 INJECTION INTRAMUSCULAR; INTRAVENOUS at 12:34

## 2024-01-01 RX ADMIN — LISINOPRIL 20 MG: 20 TABLET ORAL at 09:12

## 2024-01-01 RX ADMIN — GLYCOPYRROLATE 0.2 MG: 0.2 INJECTION INTRAMUSCULAR; INTRAVENOUS at 18:39

## 2024-01-01 RX ADMIN — LORAZEPAM 0.5 MG: 2 INJECTION INTRAMUSCULAR; INTRAVENOUS at 18:42

## 2024-01-01 RX ADMIN — MEMANTINE HYDROCHLORIDE 5 MG: 5 TABLET, FILM COATED ORAL at 08:45

## 2024-01-01 RX ADMIN — OLANZAPINE 2.5 MG: 2.5 TABLET, FILM COATED ORAL at 10:38

## 2024-01-01 RX ADMIN — LORAZEPAM 0.5 MG: 0.5 TABLET ORAL at 08:34

## 2024-01-01 RX ADMIN — GLYCOPYRROLATE 0.2 MG: 0.2 INJECTION INTRAMUSCULAR; INTRAVENOUS at 09:52

## 2024-01-01 RX ADMIN — LORAZEPAM 0.5 MG: 0.5 TABLET ORAL at 17:05

## 2024-01-01 RX ADMIN — CITALOPRAM 10 MG: 10 TABLET, FILM COATED ORAL at 08:34

## 2024-01-01 RX ADMIN — GLYCOPYRROLATE 0.2 MG: 0.2 INJECTION INTRAMUSCULAR; INTRAVENOUS at 12:34

## 2024-01-01 RX ADMIN — DIVALPROEX SODIUM 250 MG: 125 CAPSULE, COATED PELLETS ORAL at 08:35

## 2024-01-01 RX ADMIN — KETOROLAC TROMETHAMINE 15 MG: 15 INJECTION, SOLUTION INTRAMUSCULAR; INTRAVENOUS at 18:41

## 2024-01-01 RX ADMIN — LORAZEPAM 1 MG: 2 INJECTION INTRAMUSCULAR; INTRAVENOUS at 00:44

## 2024-01-01 RX ADMIN — OLANZAPINE 2.5 MG: 2.5 TABLET, FILM COATED ORAL at 08:35

## 2024-01-01 RX ADMIN — DIVALPROEX SODIUM 125 MG: 125 CAPSULE, COATED PELLETS ORAL at 20:47

## 2024-01-01 RX ADMIN — MORPHINE SULFATE 4 MG: 4 INJECTION, SOLUTION INTRAMUSCULAR; INTRAVENOUS at 19:26

## 2024-01-01 RX ADMIN — DIVALPROEX SODIUM 125 MG: 125 CAPSULE, COATED PELLETS ORAL at 15:00

## 2024-01-01 RX ADMIN — DIVALPROEX SODIUM 250 MG: 125 CAPSULE, COATED PELLETS ORAL at 09:14

## 2024-01-01 RX ADMIN — LISINOPRIL 20 MG: 20 TABLET ORAL at 09:20

## 2024-01-01 RX ADMIN — TAMSULOSIN HYDROCHLORIDE 0.4 MG: 0.4 CAPSULE ORAL at 20:47

## 2024-01-01 RX ADMIN — CEFDINIR 300 MG: 300 CAPSULE ORAL at 02:12

## 2024-01-01 RX ADMIN — Medication 10 ML: at 08:47

## 2024-01-01 RX ADMIN — LORAZEPAM 0.5 MG: 2 INJECTION INTRAMUSCULAR; INTRAVENOUS at 11:51

## 2024-01-01 RX ADMIN — HYDROCODONE BITARTRATE AND ACETAMINOPHEN 1 TABLET: 5; 325 TABLET ORAL at 21:26

## 2024-01-01 RX ADMIN — GLYCOPYRROLATE 0.2 MG: 0.2 INJECTION INTRAMUSCULAR; INTRAVENOUS at 14:49

## 2024-01-01 RX ADMIN — HYDROCODONE BITARTRATE AND ACETAMINOPHEN 1 TABLET: 5; 325 TABLET ORAL at 08:34

## 2024-01-01 RX ADMIN — CEFDINIR 300 MG: 300 CAPSULE ORAL at 08:45

## 2024-01-01 RX ADMIN — ACETAMINOPHEN 325MG 650 MG: 325 TABLET ORAL at 15:23

## 2024-01-01 RX ADMIN — MORPHINE SULFATE 2 MG: 2 INJECTION, SOLUTION INTRAMUSCULAR; INTRAVENOUS at 00:50

## 2024-01-01 RX ADMIN — LORAZEPAM 1 MG: 2 INJECTION INTRAMUSCULAR; INTRAVENOUS at 17:27

## 2024-01-01 RX ADMIN — MORPHINE SULFATE 2 MG: 2 INJECTION, SOLUTION INTRAMUSCULAR; INTRAVENOUS at 13:03

## 2024-01-01 RX ADMIN — MORPHINE SULFATE 2 MG: 2 INJECTION, SOLUTION INTRAMUSCULAR; INTRAVENOUS at 14:30

## 2024-01-01 RX ADMIN — DIVALPROEX SODIUM 250 MG: 125 CAPSULE, COATED PELLETS ORAL at 15:07

## 2024-01-01 RX ADMIN — DIVALPROEX SODIUM 250 MG: 125 CAPSULE, COATED PELLETS ORAL at 16:01

## 2024-01-01 RX ADMIN — HYDROMORPHONE HYDROCHLORIDE 1 MG: 1 INJECTION, SOLUTION INTRAMUSCULAR; INTRAVENOUS; SUBCUTANEOUS at 14:49

## 2024-01-01 RX ADMIN — LISINOPRIL 20 MG: 20 TABLET ORAL at 08:45

## 2024-01-01 RX ADMIN — LORAZEPAM 1 MG: 2 INJECTION INTRAMUSCULAR; INTRAVENOUS at 19:25

## 2024-01-01 RX ADMIN — CITALOPRAM 10 MG: 10 TABLET, FILM COATED ORAL at 09:12

## 2024-01-01 RX ADMIN — MORPHINE SULFATE 4 MG: 4 INJECTION, SOLUTION INTRAMUSCULAR; INTRAVENOUS at 00:44

## 2024-01-01 RX ADMIN — LORAZEPAM 0.5 MG: 2 INJECTION INTRAMUSCULAR; INTRAVENOUS at 09:02

## 2024-01-01 RX ADMIN — LORAZEPAM 0.5 MG: 2 INJECTION INTRAMUSCULAR; INTRAVENOUS at 13:10

## 2024-01-01 RX ADMIN — LORAZEPAM 1 MG: 2 INJECTION INTRAMUSCULAR; INTRAVENOUS at 15:37

## 2024-01-01 RX ADMIN — LORAZEPAM 1 MG: 2 INJECTION INTRAMUSCULAR; INTRAVENOUS at 18:34

## 2024-01-01 RX ADMIN — LORAZEPAM 1 MG: 2 INJECTION INTRAMUSCULAR; INTRAVENOUS at 14:30

## 2024-01-01 RX ADMIN — LORAZEPAM 1 MG: 2 INJECTION INTRAMUSCULAR; INTRAVENOUS at 13:03

## 2024-01-01 RX ADMIN — HYDROMORPHONE HYDROCHLORIDE 0.5 MG: 1 INJECTION, SOLUTION INTRAMUSCULAR; INTRAVENOUS; SUBCUTANEOUS at 22:57

## 2024-01-01 RX ADMIN — LORAZEPAM 0.5 MG: 0.5 TABLET ORAL at 21:22

## 2024-01-01 RX ADMIN — HYDROMORPHONE HYDROCHLORIDE 1 MG: 1 INJECTION, SOLUTION INTRAMUSCULAR; INTRAVENOUS; SUBCUTANEOUS at 12:11

## 2024-01-01 RX ADMIN — LORAZEPAM 0.5 MG: 0.5 TABLET ORAL at 22:43

## 2024-01-01 RX ADMIN — HYDROCODONE BITARTRATE AND ACETAMINOPHEN 1 TABLET: 5; 325 TABLET ORAL at 18:12

## 2024-01-01 RX ADMIN — OLANZAPINE 2.5 MG: 10 INJECTION, POWDER, FOR SOLUTION INTRAMUSCULAR at 17:46

## 2024-01-01 RX ADMIN — GLYCOPYRROLATE 0.2 MG: 0.2 INJECTION INTRAMUSCULAR; INTRAVENOUS at 04:58

## 2024-01-01 RX ADMIN — DIVALPROEX SODIUM 125 MG: 125 CAPSULE, COATED PELLETS ORAL at 08:45

## 2024-01-01 RX ADMIN — ACETAMINOPHEN 325MG 650 MG: 325 TABLET ORAL at 08:45

## 2024-01-01 RX ADMIN — TAMSULOSIN HYDROCHLORIDE 0.4 MG: 0.4 CAPSULE ORAL at 08:34

## 2024-01-01 RX ADMIN — MORPHINE SULFATE 4 MG: 4 INJECTION, SOLUTION INTRAMUSCULAR; INTRAVENOUS at 04:58

## 2024-01-01 RX ADMIN — LORAZEPAM 1 MG: 2 INJECTION INTRAMUSCULAR; INTRAVENOUS at 12:00

## 2024-01-01 RX ADMIN — MEMANTINE HYDROCHLORIDE 5 MG: 5 TABLET, FILM COATED ORAL at 02:12

## 2024-01-01 RX ADMIN — Medication 10 ML: at 08:36

## 2024-01-01 RX ADMIN — OLANZAPINE 2.5 MG: 2.5 TABLET, FILM COATED ORAL at 21:26

## 2024-01-01 RX ADMIN — LIDOCAINE HYDROCHLORIDE: 20 JELLY TOPICAL at 17:00

## 2024-01-01 RX ADMIN — Medication 10 ML: at 14:50

## 2024-01-01 RX ADMIN — LORAZEPAM 2 MG: 2 INJECTION INTRAMUSCULAR; INTRAVENOUS at 01:07

## 2024-01-01 RX ADMIN — LORAZEPAM 0.5 MG: 0.5 TABLET ORAL at 15:07

## 2024-01-01 RX ADMIN — LORAZEPAM 0.5 MG: 2 INJECTION INTRAMUSCULAR; INTRAVENOUS at 20:01

## 2024-01-01 RX ADMIN — MORPHINE SULFATE 2 MG: 2 INJECTION, SOLUTION INTRAMUSCULAR; INTRAVENOUS at 17:16

## 2024-01-01 RX ADMIN — GLYCOPYRROLATE 0.2 MG: 0.2 INJECTION INTRAMUSCULAR; INTRAVENOUS at 07:42

## 2024-01-01 RX ADMIN — DIVALPROEX SODIUM 250 MG: 125 CAPSULE, COATED PELLETS ORAL at 17:06

## 2024-01-01 RX ADMIN — Medication 10 ML: at 02:17

## 2024-01-01 RX ADMIN — LORAZEPAM 1 MG: 2 INJECTION INTRAMUSCULAR; INTRAVENOUS at 03:29

## 2024-01-01 RX ADMIN — MORPHINE SULFATE 2 MG: 2 INJECTION, SOLUTION INTRAMUSCULAR; INTRAVENOUS at 17:01

## 2024-01-01 RX ADMIN — Medication 10 ML: at 21:21

## 2024-01-01 RX ADMIN — LORAZEPAM 1 MG: 2 INJECTION INTRAMUSCULAR; INTRAVENOUS at 17:01

## 2024-01-01 RX ADMIN — ACETAMINOPHEN 325MG 650 MG: 325 TABLET ORAL at 02:12

## 2024-01-01 RX ADMIN — OLANZAPINE 5 MG: 10 INJECTION, POWDER, FOR SOLUTION INTRAMUSCULAR at 16:52

## 2024-01-01 RX ADMIN — CEFDINIR 300 MG: 300 CAPSULE ORAL at 09:20

## 2024-01-01 RX ADMIN — GLYCOPYRROLATE 0.2 MG: 0.2 INJECTION INTRAMUSCULAR; INTRAVENOUS at 17:27

## 2024-01-01 RX ADMIN — LORAZEPAM 2 MG: 2 INJECTION INTRAMUSCULAR; INTRAVENOUS at 21:17

## 2024-01-01 RX ADMIN — MORPHINE SULFATE 2 MG: 2 INJECTION, SOLUTION INTRAMUSCULAR; INTRAVENOUS at 03:30

## 2024-01-01 RX ADMIN — MIRTAZAPINE 15 MG: 15 TABLET, ORALLY DISINTEGRATING ORAL at 20:44

## 2024-01-01 RX ADMIN — HYDROMORPHONE HYDROCHLORIDE 1 MG: 1 INJECTION, SOLUTION INTRAMUSCULAR; INTRAVENOUS; SUBCUTANEOUS at 09:52

## 2024-01-01 RX ADMIN — ACETAMINOPHEN 325MG 650 MG: 325 TABLET ORAL at 20:47

## 2024-01-01 RX ADMIN — GLYCOPYRROLATE 0.2 MG: 0.2 INJECTION INTRAMUSCULAR; INTRAVENOUS at 12:11

## 2024-01-01 RX ADMIN — DIVALPROEX SODIUM 250 MG: 125 CAPSULE, COATED PELLETS ORAL at 21:21

## 2024-01-01 RX ADMIN — MIRTAZAPINE 15 MG: 15 TABLET, ORALLY DISINTEGRATING ORAL at 21:26

## 2024-01-01 RX ADMIN — LORAZEPAM 0.5 MG: 0.5 TABLET ORAL at 09:14

## 2024-01-01 RX ADMIN — MEMANTINE HYDROCHLORIDE 5 MG: 5 TABLET, FILM COATED ORAL at 09:12

## 2024-01-01 RX ADMIN — HYDROMORPHONE HYDROCHLORIDE 0.5 MG: 1 INJECTION, SOLUTION INTRAMUSCULAR; INTRAVENOUS; SUBCUTANEOUS at 17:29

## 2024-01-01 RX ADMIN — KETOROLAC TROMETHAMINE 15 MG: 15 INJECTION, SOLUTION INTRAMUSCULAR; INTRAVENOUS at 09:02

## 2024-01-01 RX ADMIN — DIVALPROEX SODIUM 250 MG: 125 CAPSULE, COATED PELLETS ORAL at 09:13

## 2024-01-01 RX ADMIN — MEMANTINE HYDROCHLORIDE 5 MG: 5 TABLET, FILM COATED ORAL at 09:20

## 2024-01-01 RX ADMIN — LORAZEPAM 2 MG: 2 INJECTION INTRAMUSCULAR; INTRAVENOUS at 18:39

## 2024-01-01 RX ADMIN — LORAZEPAM 0.5 MG: 0.5 TABLET ORAL at 16:01

## 2024-01-01 RX ADMIN — DIVALPROEX SODIUM 250 MG: 125 CAPSULE, COATED PELLETS ORAL at 23:00

## 2024-07-08 PROBLEM — F03.911 DEMENTIA WITH AGITATION: Status: ACTIVE | Noted: 2024-01-01

## 2024-07-08 NOTE — NURSING NOTE
ATTENTION ALL PROVIDERS: THIS PT IS A CURRENT HOSPICE PT WITH OUR HOME TEAM SERVICES. PLEASE CALL OUR CUSTOMER SUPPORT WITH ANY TREATMENT PLAN QUESTIONS AND/OR IF PT IS ADMITTED TO THE HOSPITAL. IF ADMITTED, HE WILL BE FOLLOWED BY OUR SCATTERED BED TEAM.     GUICHO GRAY RN  SCATTERED BED TEAM  [phone]

## 2024-07-08 NOTE — ED PROVIDER NOTES
EMERGENCY DEPARTMENT ENCOUNTER    Room Number:  P488/1  PCP: Provider, No Known  Historian:, EMS, family at bedside    I initially evaluated the patient at 1909    HPI:  Chief Complaint: Agitation, dementia  A complete HPI/ROS/PMH/PSH/SH/FH are unobtainable due to: Dementia  Context: Ac Green is a 79 y.o. male with a medical history of dementia who presents to the ED from home with reports of acute agitation.  Patient has advanced dementia.  He lives at home with his wife.  His wife is having difficulty caring for him.  Family has been trying to get him into a memory care facility.  He is currently under [facility].  Family reports he has been getting more more agitated and uncooperative recently.  He has had several falls.  He last fell 3 days ago and was seen at another ED.  CTs of the head and C-spine were negative acute at that time.  Per family, patient is not eating or drinking as much is normal.  He has not had a fever, cough, or vomiting.  He is currently on cefdinir for a UTI.            PAST MEDICAL HISTORY  Active Ambulatory Problems     Diagnosis Date Noted    No Active Ambulatory Problems     Resolved Ambulatory Problems     Diagnosis Date Noted    No Resolved Ambulatory Problems     Past Medical History:   Diagnosis Date    Hypertension     Sleep apnea          PAST SURGICAL HISTORY  Past Surgical History:   Procedure Laterality Date    PROSTATE SURGERY  2022         FAMILY HISTORY  History reviewed. No pertinent family history.      SOCIAL HISTORY  Social History     Socioeconomic History    Marital status:    Tobacco Use    Smoking status: Never     Passive exposure: Never    Smokeless tobacco: Never   Vaping Use    Vaping status: Never Used   Substance and Sexual Activity    Alcohol use: Never    Drug use: Never    Sexual activity: Defer         ALLERGIES  Patient has no known allergies.    REVIEW OF SYSTEMS  Review of Systems  Unobtainable    PHYSICAL EXAM  ED Triage Vitals [07/08/24  1658]   Temp Heart Rate Resp BP SpO2   98.2 °F (36.8 °C) 86 18 126/84 95 %      Temp src Heart Rate Source Patient Position BP Location FiO2 (%)   -- -- -- -- --       Physical Exam      GENERAL: Awake and alert.  Patient is intermittently agitated and uncooperative.  Frail elderly male.  Does not appear overtly toxic.  No acute distress  HENT: nares patent, there is a laceration on the posterior scalp with staples in place, no evidence of infection  EYES: PERRL  CV: regular rhythm, normal rate  RESPIRATORY: normal effort, clear to auscultation bilaterally  ABDOMEN: soft  MUSCULOSKELETAL: Extremities are nontender with full range of motion.  C/T/L-spine, chest, and pelvis are nontender  NEURO: Follows simple commands.  Moves all extremities.  No facial droop  PSYCH: Intermittently agitated  SKIN: warm, dry    Vital signs and nursing notes reviewed.          LAB RESULTS  Recent Results (from the past 24 hour(s))   Urinalysis With Microscopic If Indicated (No Culture) - Urine, Clean Catch    Collection Time: 07/08/24  8:05 PM    Specimen: Urine, Clean Catch   Result Value Ref Range    Color, UA Yellow Yellow, Straw    Appearance, UA Clear Clear    pH, UA 7.0 5.0 - 8.0    Specific Gravity, UA 1.013 1.005 - 1.030    Glucose, UA Negative Negative    Ketones, UA Negative Negative    Bilirubin, UA Negative Negative    Blood, UA Negative Negative    Protein, UA Trace (A) Negative    Leuk Esterase, UA Negative Negative    Nitrite, UA Negative Negative    Urobilinogen, UA 1.0 E.U./dL 0.2 - 1.0 E.U./dL   Comprehensive Metabolic Panel    Collection Time: 07/08/24 10:26 PM    Specimen: Blood   Result Value Ref Range    Glucose 80 65 - 99 mg/dL    BUN 24 (H) 8 - 23 mg/dL    Creatinine 0.83 0.76 - 1.27 mg/dL    Sodium 142 136 - 145 mmol/L    Potassium 4.2 3.5 - 5.2 mmol/L    Chloride 103 98 - 107 mmol/L    CO2 28.0 22.0 - 29.0 mmol/L    Calcium 9.7 8.6 - 10.5 mg/dL    Total Protein 6.4 6.0 - 8.5 g/dL    Albumin 3.7 3.5 - 5.2 g/dL     ALT (SGPT) 20 1 - 41 U/L    AST (SGOT) 24 1 - 40 U/L    Alkaline Phosphatase 89 39 - 117 U/L    Total Bilirubin 0.5 0.0 - 1.2 mg/dL    Globulin 2.7 gm/dL    A/G Ratio 1.4 g/dL    BUN/Creatinine Ratio 28.9 (H) 7.0 - 25.0    Anion Gap 11.0 5.0 - 15.0 mmol/L    eGFR 87.9 >60.0 mL/min/1.73   CBC Auto Differential    Collection Time: 07/08/24 10:26 PM    Specimen: Blood   Result Value Ref Range    WBC 6.06 3.40 - 10.80 10*3/mm3    RBC 4.14 4.14 - 5.80 10*6/mm3    Hemoglobin 13.1 13.0 - 17.7 g/dL    Hematocrit 39.1 37.5 - 51.0 %    MCV 94.4 79.0 - 97.0 fL    MCH 31.6 26.6 - 33.0 pg    MCHC 33.5 31.5 - 35.7 g/dL    RDW 12.3 12.3 - 15.4 %    RDW-SD 42.7 37.0 - 54.0 fl    MPV 8.9 6.0 - 12.0 fL    Platelets 239 140 - 450 10*3/mm3    Neutrophil % 67.0 42.7 - 76.0 %    Lymphocyte % 23.3 19.6 - 45.3 %    Monocyte % 8.1 5.0 - 12.0 %    Eosinophil % 1.0 0.3 - 6.2 %    Basophil % 0.3 0.0 - 1.5 %    Immature Grans % 0.3 0.0 - 0.5 %    Neutrophils, Absolute 4.06 1.70 - 7.00 10*3/mm3    Lymphocytes, Absolute 1.41 0.70 - 3.10 10*3/mm3    Monocytes, Absolute 0.49 0.10 - 0.90 10*3/mm3    Eosinophils, Absolute 0.06 0.00 - 0.40 10*3/mm3    Basophils, Absolute 0.02 0.00 - 0.20 10*3/mm3    Immature Grans, Absolute 0.02 0.00 - 0.05 10*3/mm3    nRBC 0.0 0.0 - 0.2 /100 WBC   Basic Metabolic Panel    Collection Time: 07/09/24  6:18 AM    Specimen: Blood   Result Value Ref Range    Glucose 77 65 - 99 mg/dL    BUN 20 8 - 23 mg/dL    Creatinine 0.78 0.76 - 1.27 mg/dL    Sodium 141 136 - 145 mmol/L    Potassium 4.0 3.5 - 5.2 mmol/L    Chloride 103 98 - 107 mmol/L    CO2 27.1 22.0 - 29.0 mmol/L    Calcium 9.1 8.6 - 10.5 mg/dL    BUN/Creatinine Ratio 25.6 (H) 7.0 - 25.0    Anion Gap 10.9 5.0 - 15.0 mmol/L    eGFR 89.6 >60.0 mL/min/1.73   CBC (No Diff)    Collection Time: 07/09/24  6:18 AM    Specimen: Blood   Result Value Ref Range    WBC 5.74 3.40 - 10.80 10*3/mm3    RBC 3.71 (L) 4.14 - 5.80 10*6/mm3    Hemoglobin 11.7 (L) 13.0 - 17.7 g/dL     Hematocrit 35.1 (L) 37.5 - 51.0 %    MCV 94.6 79.0 - 97.0 fL    MCH 31.5 26.6 - 33.0 pg    MCHC 33.3 31.5 - 35.7 g/dL    RDW 12.2 (L) 12.3 - 15.4 %    RDW-SD 42.3 37.0 - 54.0 fl    MPV 9.0 6.0 - 12.0 fL    Platelets 234 140 - 450 10*3/mm3       Ordered the above labs and reviewed the results.        RADIOLOGY  No Radiology Exams Resulted Within Past 24 Hours    Ordered the above noted radiological studies. Reviewed by me in PACS.            PROCEDURES  Procedures      OUTPATIENT MEDICATION MANAGEMENT:  Current Facility-Administered Medications Ordered in Epic   Medication Dose Route Frequency Provider Last Rate Last Admin    acetaminophen (TYLENOL) tablet 650 mg  650 mg Oral Q4H PRN Gail Lux APRN   650 mg at 07/09/24 0212    Or    acetaminophen (TYLENOL) 160 MG/5ML oral solution 650 mg  650 mg Oral Q4H PRN Gail Lux APRN        Or    acetaminophen (TYLENOL) suppository 650 mg  650 mg Rectal Q4H PRN Gail Lux APRN        sennosides-docusate (PERICOLACE) 8.6-50 MG per tablet 2 tablet  2 tablet Oral BID PRN Gail Lux APRN        And    polyethylene glycol (MIRALAX) packet 17 g  17 g Oral Daily PRN Gail Lux APRN        And    bisacodyl (DULCOLAX) EC tablet 5 mg  5 mg Oral Daily PRN Gail Lux APRN        And    bisacodyl (DULCOLAX) suppository 10 mg  10 mg Rectal Daily PRN Gail Lux APRN        calcium carbonate (TUMS) chewable tablet 500 mg (200 mg elemental)  2 tablet Oral BID PRN Gail Lux APRN        cefdinir (OMNICEF) capsule 300 mg  300 mg Oral BID Gail Lux APRN   300 mg at 07/09/24 0920    citalopram (CeleXA) tablet 10 mg  10 mg Oral Daily Gail Lux APRN   10 mg at 07/09/24 0920    Divalproex Sodium (DEPAKOTE SPRINKLE) capsule 125 mg  125 mg Oral TID Bhaskar Duron III, MD   125 mg at 07/09/24 1500    Lidocaine HCl gel (XYLOCAINE) urethral/mucosal syringe   Topical Once Colvin,  TOMMY Zaragoza        lisinopril (PRINIVIL,ZESTRIL) tablet 20 mg  20 mg Oral Daily Gail Lux APRN   20 mg at 07/09/24 0920    LORazepam (ATIVAN) tablet 0.5 mg  0.5 mg Oral Nightly Gail Lux APRN   0.5 mg at 07/09/24 0212    memantine (NAMENDA) tablet 5 mg  5 mg Oral BID Gail Lux APRN   5 mg at 07/09/24 0920    mirtazapine (REMERON SOL-TAB) disintegrating tablet 15 mg  15 mg Oral Nightly Gail Lux APRN   15 mg at 07/09/24 0212    morphine injection 2 mg  2 mg Intravenous Q4H PRN Viktor Tejada MD   2 mg at 07/09/24 1747    OLANZapine (zyPREXA) injection 5 mg  5 mg Intramuscular Q8H PRN Bhaskar Duron III, MD   5 mg at 07/09/24 1457    ondansetron ODT (ZOFRAN-ODT) disintegrating tablet 4 mg  4 mg Oral Q6H PRN Gail Lux APRN        Or    ondansetron (ZOFRAN) injection 4 mg  4 mg Intravenous Q6H PRN Gail Lux APRN        sodium chloride 0.9 % flush 10 mL  10 mL Intravenous Q12H Gail Lux APRN   10 mL at 07/09/24 0920    sodium chloride 0.9 % flush 10 mL  10 mL Intravenous PRN Gial Lux APRN        sodium chloride 0.9 % infusion 40 mL  40 mL Intravenous PRN Gail Lux APRN         No current [facility]-ordered outpatient medications on file.           MEDICATIONS GIVEN IN ER  Medications   sodium chloride 0.9 % flush 10 mL (10 mL Intravenous Given 7/9/24 0920)   sodium chloride 0.9 % flush 10 mL (has no administration in time range)   sodium chloride 0.9 % infusion 40 mL (has no administration in time range)   acetaminophen (TYLENOL) tablet 650 mg (650 mg Oral Given 7/9/24 0212)     Or   acetaminophen (TYLENOL) 160 MG/5ML oral solution 650 mg ( Oral Not Given:  See Alt 7/9/24 0212)     Or   acetaminophen (TYLENOL) suppository 650 mg ( Rectal Not Given:  See Alt 7/9/24 0212)   sennosides-docusate (PERICOLACE) 8.6-50 MG per tablet 2 tablet (has no administration in time range)     And   polyethylene glycol  (MIRALAX) packet 17 g (has no administration in time range)     And   bisacodyl (DULCOLAX) EC tablet 5 mg (has no administration in time range)     And   bisacodyl (DULCOLAX) suppository 10 mg (has no administration in time range)   ondansetron ODT (ZOFRAN-ODT) disintegrating tablet 4 mg (has no administration in time range)     Or   ondansetron (ZOFRAN) injection 4 mg (has no administration in time range)   calcium carbonate (TUMS) chewable tablet 500 mg (200 mg elemental) (has no administration in time range)   cefdinir (OMNICEF) capsule 300 mg (300 mg Oral Given 7/9/24 0920)   citalopram (CeleXA) tablet 10 mg (10 mg Oral Given 7/9/24 0920)   lisinopril (PRINIVIL,ZESTRIL) tablet 20 mg (20 mg Oral Given 7/9/24 0920)   LORazepam (ATIVAN) tablet 0.5 mg (0.5 mg Oral Given 7/9/24 0212)   memantine (NAMENDA) tablet 5 mg (5 mg Oral Given 7/9/24 0920)   mirtazapine (REMERON SOL-TAB) disintegrating tablet 15 mg (15 mg Oral Given 7/9/24 0212)   Divalproex Sodium (DEPAKOTE SPRINKLE) capsule 125 mg (125 mg Oral Given 7/9/24 1500)   OLANZapine (zyPREXA) injection 5 mg (5 mg Intramuscular Given 7/9/24 1457)   Lidocaine HCl gel (XYLOCAINE) urethral/mucosal syringe (has no administration in time range)   morphine injection 2 mg (2 mg Intravenous Given 7/9/24 1747)   OLANZapine (zyPREXA) injection 2.5 mg (2.5 mg Intramuscular Given 7/8/24 1746)   OLANZapine (zyPREXA) injection 2.5 mg (2.5 mg Intramuscular Given 7/8/24 1905)   haloperidol lactate (HALDOL) injection 1 mg (1 mg Intramuscular Given 7/8/24 2134)                   MEDICAL DECISION MAKING, PROGRESS, and CONSULTS    All labs have been independently reviewed by me.  All radiology studies have been reviewed by me and I have also reviewed the radiology report.   EKG's independently viewed and interpreted by me.  Discussion below represents my analysis of pertinent findings related to patient's condition, differential diagnosis, treatment plan and final  disposition.      Additional sources:    - Discussed/ obtained information from independent historians: EMS, spouse and daughter at bedside    - External (non-ED) record review: CT abdomen/pelvis done on 6/29/2024 showed a large stool ball in the rectum.  There was circumferential bladder wall thickening.  Patient does not have any prior admissions here.    -Prescription drug monitoring program review:     N/A    - Chronic or social conditions impacting patient care (Social Determinants of Health): None          Orders placed during this visit:  Orders Placed This Encounter   Procedures    Comprehensive Metabolic Panel    Urinalysis With Microscopic If Indicated (No Culture) - Urine, Clean Catch    CBC Auto Differential    Basic Metabolic Panel    CBC (No Diff)    Diet: Regular/House; Fluid Consistency: Thin (IDDSI 0)    Vital Signs    Intake & Output    Weigh Patient    Oral Care    Saline Lock & Maintain IV Access    Place Sequential Compression Device    Maintain Sequential Compression Device    Insert Indwelling Urinary Catheter    Assess Need for Indwelling Urinary Catheter - Follow Removal Protocol    Urinary Catheter Care    Code Status and Medical Interventions:    [facility] (on-call MD unless specified) Details    Inpatient Case Management  Consult    Inpatient Nutrition Consult    Inpatient Psychiatrist Consult    Inpatient Hospice / [facility] Consult    Inpatient Urology Consult    Dietary Nutrition Supplements Boost Plus (Ensure Enlive, Ensure Plus)    Insert Peripheral IV    Initiate Observation Status    Initiate Hospice    Restraints Non-Violent or Non-Self Destructive    CBC & Differential         Additional orders considered but not ordered:          Differential diagnosis includes, but is not limited to:    Dementia, electrolyte abnormality, dehydration, UTI, agitation      Independent interpretation of labs, radiology studies, and discussions with consultants:  ED Course as of 07/09/24  1846 Mon Jul 08, 2024   1725 Patient presents to the ED with reports of increased agitation.  He has a history of dementia.  He lives at home with his wife.  He has had several falls recently.  Patient is somewhat agitated and not overly cooperative.  He will be given a dose of IM Zyprexa.  Will obtain labs for further evaluation.  He will most likely need to be admitted for placement. [WH]   1837 Patient is still not cooperative for lab draw.  Case discussed with Fab, ED pharmacist.  He recommends giving another 2.5 mg of Zyprexa [WH]   1842 Additional history obtained from the wife at bedside.  Patient is on Ativan 0.5 mg at night.  He is also on citalopram, mirtazapine, and Aricept.  She states she is no longer able to care for the patient at home as he is agitated and uncooperative.  She is trying to get him into the [facility] which has a memory care unit. [WH]   1852 Case discussed with Dr. Tejada and he agrees to admit the patient to a [facility] bed.  Pertinent history and exam findings were discussed with him. [WH]      ED Course User Index  [WH] Keshav Abdalla MD         COMPLEXITY OF CARE  The patient requires admission.      DIAGNOSIS  Final diagnoses:   Dementia with agitation, unspecified dementia severity, unspecified dementia type         DISPOSITION  ADMISSION    Discussed treatment plan and reason for admission with pt/family and admitting physician.  Pt/family voiced understanding of the plan for admission for further testing/treatment as needed.               Latest Documented Vital Signs:  AS OF 18:46 EDT VITALS:    BP - 118/71  HR - 74  TEMP - 97.4 °F (36.3 °C) (Oral)  O2 SATS - 96%            --    Please note that portions of this were completed with a voice recognition program.       Note Disclaimer: At [facility], we believe that sharing information builds trust and better relationships. You are receiving this note because you are receiving care at [facility] or recently visited.  It is possible you will see health information before a provider has talked with you about it. This kind of information can be easy to misunderstand. To help you fully understand what it means for your health, we urge you to discuss this note with your provider.             Keshav Abdalla MD  07/09/24 9144

## 2024-07-08 NOTE — ED TRIAGE NOTES
Pt from home via ems, hx of dementia, care giver having difficulty taking care of him d/t being uncooperative at home, recent fall with lac to head, currently being treated for UTI, is [facility]

## 2024-07-09 PROBLEM — Z51.5 HOSPICE CARE PATIENT: Status: ACTIVE | Noted: 2024-01-01

## 2024-07-09 NOTE — PLAN OF CARE
Problem: Malnutrition  Goal: Improved Nutritional Intake  Outcome: Ongoing, Progressing   Goal Outcome Evaluation:                 RD to follow.

## 2024-07-09 NOTE — PLAN OF CARE
Goal Outcome Evaluation:  Plan of Care Reviewed With: patient, spouse        Progress: no change  Outcome Evaluation: Pt is alert, very agitated and spitting at staff. Pt frequently spits out his medication when trying to take them. Bilateral wrist restraints in place. Sitter remains at bedside. x2 bladder scans performed. x1 I/O cath performed. Second I/O attempted to be performed but RN noted blood clot. MD notified, urology consulted. X1 IV 2 morphie given. RN unable to insert coude, urology was able to place @ bedside. VSS. RA. Will cont plan of care.

## 2024-07-09 NOTE — H&P
Patient Name:  Ac Green  YOB: 1945  MRN:  [id number]  Admit Date:  7/8/2024  Patient Care Team:  Provider, No Known as PCP - General      Subjective   History Present Illness     Chief Complaint   Patient presents with    Altered Mental Status       History of Present Illness  This is a 79-year-old male with history of dementia, presents to the hospital with reports of acute agitation.  Patient has severely advanced dementia.  His wife is having difficulty caring for him.  Family has been trying to get him into a memory care facility, he is currently under hospice care.  Patient admitted here to the hospital with hospice inpatient services.      Review of Systems   Unable to obtain due to altered mental status.    Personal History     Past Medical History:   Diagnosis Date    Hypertension     Sleep apnea      Past Surgical History:   Procedure Laterality Date    PROSTATE SURGERY  2022     History reviewed. No pertinent family history.  Social History     Tobacco Use    Smoking status: Never     Passive exposure: Never    Smokeless tobacco: Never   Vaping Use    Vaping status: Never Used   Substance Use Topics    Alcohol use: Never    Drug use: Never     No current facility-administered medications on file prior to encounter.     Current Outpatient Medications on File Prior to Encounter   Medication Sig Dispense Refill    cefdinir (OMNICEF) 300 MG capsule Take 1 capsule by mouth 2 (Two) Times a Day.      citalopram (CeleXA) 10 MG tablet Take 1 tablet by mouth Daily.      lisinopril (PRINIVIL,ZESTRIL) 20 MG tablet Take 1 tablet by mouth Daily.      LORazepam (ATIVAN) 0.5 MG tablet Take 1 tablet by mouth Every Night.      memantine (NAMENDA) 5 MG tablet Take 1 tablet by mouth 2 (Two) Times a Day.      mirtazapine (REMERON SOL-TAB) 15 MG disintegrating tablet Place 1 tablet on the tongue Every Night.      senna 8.6 MG tablet Take 2 tablets by mouth Daily.       No Known Allergies    Objective     Objective     Vital Signs  Temp:  [97 °F (36.1 °C)-98.2 °F (36.8 °C)] 97.1 °F (36.2 °C)  Heart Rate:  [60-86] 84  Resp:  [16-18] 16  BP: (121-157)/() 132/88  SpO2:  [95 %-100 %] 100 %  on   ;   Device (Oxygen Therapy): room air  Body mass index is 16.36 kg/m².    Physical Exam  General, appears very lethargic  Head and ENT, normocephalic and atraumatic.  Lungs, symmetric expansion, equal air entry bilaterally.  Heart, regular rate and rhythm.  Abdomen, soft and nontender.  Extremities, no clubbing or cyanosis.  Neuro, unable to fully evaluate  Skin: Warm and no rash.  Psych, unable to fully evaluate  Musculoskeletal, joint examination is grossly normal.    Results Review:  I reviewed the patient's new clinical results.  I reviewed the patient's new imaging results and agree with the interpretation.  I reviewed the patient's other test results and agree with the interpretation  I personally viewed and interpreted the patient's EKG/Telemetry data  Discussed with ED provider.    Lab Results (last 24 hours)       Procedure Component Value Units Date/Time    Urinalysis With Microscopic If Indicated (No Culture) - Urine, Clean Catch [[id number]]  (Abnormal) Collected: 07/08/24 2005    Specimen: Urine, Clean Catch Updated: 07/08/24 2028     Color, UA Yellow     Appearance, UA Clear     pH, UA 7.0     Specific Gravity, UA 1.013     Glucose, UA Negative     Ketones, UA Negative     Bilirubin, UA Negative     Blood, UA Negative     Protein, UA Trace     Leuk Esterase, UA Negative     Nitrite, UA Negative     Urobilinogen, UA 1.0 E.U./dL    Narrative:      Urine microscopic not indicated.    CBC & Differential [[id number]]  (Normal) Collected: 07/08/24 2226    Specimen: Blood Updated: 07/08/24 2236    Narrative:      The following orders were created for panel order CBC & Differential.  Procedure                               Abnormality         Status                     ---------                                -----------         ------                     CBC Auto Differential[[id number]]        Normal              Final result                 Please view results for these tests on the individual orders.    Comprehensive Metabolic Panel [[id number]]  (Abnormal) Collected: 07/08/24 2226    Specimen: Blood Updated: 07/08/24 2302     Glucose 80 mg/dL      BUN 24 mg/dL      Creatinine 0.83 mg/dL      Sodium 142 mmol/L      Potassium 4.2 mmol/L      Comment: Slight hemolysis detected by analyzer. Result may be falsely elevated.        Chloride 103 mmol/L      CO2 28.0 mmol/L      Calcium 9.7 mg/dL      Total Protein 6.4 g/dL      Albumin 3.7 g/dL      ALT (SGPT) 20 U/L      AST (SGOT) 24 U/L      Comment: Slight hemolysis detected by analyzer. Result may be falsely elevated.        Alkaline Phosphatase 89 U/L      Total Bilirubin 0.5 mg/dL      Globulin 2.7 gm/dL      A/G Ratio 1.4 g/dL      BUN/Creatinine Ratio 28.9     Anion Gap 11.0 mmol/L      eGFR 87.9 mL/min/1.73     Narrative:      GFR Normal >60  Chronic Kidney Disease <60  Kidney Failure <15    The GFR formula is only valid for adults with stable renal function between ages 18 and 70.    CBC Auto Differential [[id number]]  (Normal) Collected: 07/08/24 2226    Specimen: Blood Updated: 07/08/24 2236     WBC 6.06 10*3/mm3      RBC 4.14 10*6/mm3      Hemoglobin 13.1 g/dL      Hematocrit 39.1 %      MCV 94.4 fL      MCH 31.6 pg      MCHC 33.5 g/dL      RDW 12.3 %      RDW-SD 42.7 fl      MPV 8.9 fL      Platelets 239 10*3/mm3      Neutrophil % 67.0 %      Lymphocyte % 23.3 %      Monocyte % 8.1 %      Eosinophil % 1.0 %      Basophil % 0.3 %      Immature Grans % 0.3 %      Neutrophils, Absolute 4.06 10*3/mm3      Lymphocytes, Absolute 1.41 10*3/mm3      Monocytes, Absolute 0.49 10*3/mm3      Eosinophils, Absolute 0.06 10*3/mm3      Basophils, Absolute 0.02 10*3/mm3      Immature Grans, Absolute 0.02 10*3/mm3      nRBC 0.0 /100 WBC     Basic Metabolic Panel [[id number]]   (Abnormal) Collected: 07/09/24 0618    Specimen: Blood Updated: 07/09/24 0728     Glucose 77 mg/dL      BUN 20 mg/dL      Creatinine 0.78 mg/dL      Sodium 141 mmol/L      Potassium 4.0 mmol/L      Chloride 103 mmol/L      CO2 27.1 mmol/L      Calcium 9.1 mg/dL      BUN/Creatinine Ratio 25.6     Anion Gap 10.9 mmol/L      eGFR 89.6 mL/min/1.73     Narrative:      GFR Normal >60  Chronic Kidney Disease <60  Kidney Failure <15    The GFR formula is only valid for adults with stable renal function between ages 18 and 70.    CBC (No Diff) [[id number]]  (Abnormal) Collected: 07/09/24 0618    Specimen: Blood Updated: 07/09/24 0656     WBC 5.74 10*3/mm3      RBC 3.71 10*6/mm3      Hemoglobin 11.7 g/dL      Hematocrit 35.1 %      MCV 94.6 fL      MCH 31.5 pg      MCHC 33.3 g/dL      RDW 12.2 %      RDW-SD 42.3 fl      MPV 9.0 fL      Platelets 234 10*3/mm3             Imaging Results (Last 24 Hours)       ** No results found for the last 24 hours. **                No orders to display        Assessment/Plan     Active Hospital Problems    Diagnosis  POA    **Dementia with agitation [F03.911]  Yes    Hospice care patient [Z51.5]  Not Applicable      Resolved Hospital Problems   No resolved problems to display.       Assessment and plan  This is a 79-year-old male with history of severe dementia, has been progressively worsening, patient is enrolled with hospice, patient's wife is having difficulty taking care of him, he has been admitted to inpatient hospice services here on [address] [place] [address].  Patient was evaluated at bedside, family members had questions which were answered.  Continue full comfort measures with hospice.  Psychiatry was consulted for management of agitation.       Viktor Tejada MD  [facility]  07/09/24  15:58 EDT

## 2024-07-09 NOTE — NURSING NOTE
Review of visit: patient is an 78 YO M with a primary diagnosis of Alzheimer's. Admitted to [facility] for GIP for mgmt. of Pain, SOA, agitation/restlessness & EOL & symptom mgmt. care.   Isolation: NA  PPS: 30%  Medications in last 24 hours: IM Zyprexa 2.5mg x1, IM Zyprexa 5mg x1, IM Haldol 1mg x1, IV Morphine 2mg x1  Recommendations:   Continue to monitor for signs of decline & provide comfort measures. Contact [facility] with any questions/concerns & at TOD.   Assessment:   Patient is sitting up in bed, alert & confused, currently in bilat soft wrist restraints, on RA RR even mildly labored, lungs D BS hypo pedals pw, skin is slightly pale/dusky, staff RN placed FC at time of visit. Upon arriving to unit received update from staff CATALINA Cardenas & reviewed medical records. Patient has been agitated, code status in Epic listed as full code & per notes from nurse visit last night son was wanting second opinion & family reluctant to medicate patient. Spouse arrived to unit shortly after this RN's arrival, lengthy discussion of GOC & spouse reports patient has had some recent falls, 4 days ago fell & hit head & went to ER at [facility]. Spouse states a scan of head was performed at this visit, as well as an ER trip to [place] recently & both “did not show anything.” Spouse confirms that if there is something underlying can be treated & will improve patient's mentation/agitation, she would like that done (such as a UTI/infection) but does not want anything more aggressive/invasive such as a biopsy/surgery or intense workups. Spouse confirms goal is more comfort-focused & feels that this hospital admission is a “second opinion” Discussed with spouse that patient is in wrist restraints & that is comfort is goal meds will be utilized to mange agitation; and while sedation is never the goal of medicating patient may be more drowsy as a result of medications & that he appears to be experiencing terminal restlessness & may need  aggressive symptom management. Spouse states the goal was to get patient into a LTC facility & someone was going to evaluate him Thursday. Discussed with spouse we can continue to try to get him to a facility but patient cannot go to a facility in restraints & he also may need more symptom management than can be provided at a lower level of care but we will evaluate on a daily basis & continue to discuss discharge planning. Spouse V/U & is agreeable to meds for symptom management.   Collaboration:   Spoke with family outside of room as staff RN was maylin caruso at time of visit, condition update provided including disease progression, symptom mgmt. & patient's condition, training provided. Family V/U of patient's condition and all questions answered. Updated staff RN Theresa, recommendations provided as appropriate.   Reviewed patient with Dr Merlin Ivory, admission is related & GIP for symptom management.   Updated Kushal Marquez.  TT sent to Glory PIERRE & Nakul.  Disposition:  Patient meets GIP criteria d/t frequent administration & continued titration of IV& IM meds to achieve/maintain symptom mgmt., dispo planning, workup & evaluation, & close monitoring. Patient appears unsafe for transport, requiring increasing symptom mgmt. & frequent nursing interventions. If patient stabilizes & needs to transition to a lower level of care, placement may be needed due to increased daily care needs. Will continue [facility] RN visits to monitor for changes, assess needs & provide support.   Please reach out with any questions/concerns. Thank you for allowing us the opportunity to participate in patient's care.     Sana Dacosta RN, BSN  Scatter Bed Team  [facility]

## 2024-07-09 NOTE — ED NOTES
Nursing report ED to floor  Ac Green  79 y.o.  male    HPI :  HPI (Adult)  Stated Reason for Visit: AMS  History Obtained From: patient, EMS    Chief Complaint  Chief Complaint   Patient presents with    Altered Mental Status       Admitting doctor:   Viktor Tejada MD    Admitting diagnosis:   The encounter diagnosis was Dementia with agitation, unspecified dementia severity, unspecified dementia type.    Code status:   Current Code Status       Date Active Code Status Order ID Comments User Context       Not on file            Allergies:   Patient has no known allergies.    Isolation:   No active isolations    Intake and Output  No intake or output data in the 24 hours ending 07/08/24 2023    Weight:   There were no vitals filed for this visit.    Most recent vitals:   Vitals:    07/08/24 1658   BP: 126/84   Pulse: 86   Resp: 18   Temp: 98.2 °F (36.8 °C)   SpO2: 95%       Active LDAs/IV Access:   Lines, Drains & Airways       Active LDAs       None                    Labs (abnormal labs have a star):   Labs Reviewed   COMPREHENSIVE METABOLIC PANEL   URINALYSIS W/ MICROSCOPIC IF INDICATED (NO CULTURE)   CBC WITH AUTO DIFFERENTIAL   CBC AND DIFFERENTIAL    Narrative:     The following orders were created for panel order CBC & Differential.  Procedure                               Abnormality         Status                     ---------                               -----------         ------                     CBC Auto Differential[[id number]]                                                         Please view results for these tests on the individual orders.       EKG:   No orders to display       Meds given in ED:   Medications   OLANZapine (zyPREXA) injection 2.5 mg (2.5 mg Intramuscular Given 7/8/24 1746)   OLANZapine (zyPREXA) injection 2.5 mg (2.5 mg Intramuscular Given 7/8/24 1905)       Imaging results:  No radiology results for the last day    Ambulatory status:   - bed rest     Social issues:    Social History     Socioeconomic History    Marital status:        Peripheral Neurovascular  Peripheral Neurovascular (Adult)  Peripheral Neurovascular WDL: WDL    Neuro Cognitive  Neuro Cognitive (Adult)  Cognitive/Neuro/Behavioral WDL: .WDL except, orientation, mood/behavior  Orientation: disoriented x 4  Mood/Behavior: agitated    Learning  Learning Assessment (Adult)  Learning Readiness and Ability: cognitive limitation noted  Education Provided  Person Taught: family member/friend  Teaching Method: verbal instruction  Teaching Focus: symptom/problem overview, diagnostic test, self-management  Education Outcome Evaluation: needs reinforcement, no evidence of learning    Respiratory  Respiratory WDL  Respiratory WDL: WDL    Abdominal Pain       Pain Assessments  Pain (Adult)  (0-10) Pain Rating: Rest: 0    NIH Stroke Scale       Karen Winn RN  07/08/24 20:23 EDT

## 2024-07-09 NOTE — CONSULTS
IDENTIFYING INFORMATION: The patient is an 79-year-old white male admitted with increasingly combative behavior at home.    CHIEF COMPLAINT: None given    INFORMANT: Staff and chart    RELIABILITY: Fair    HISTORY OF PRESENT ILLNESS: The patient is an 79-year-old white male with a history of dementia.  He has become increasingly agitated and aggressive at home with his wife.  His currently prescribed psychotropic medications include Celexa, Ativan, Namenda and Remeron.  The patient is currently under hospice care and the family is trying to get him into a memory care unit.  When seen today the patient is sleeping soundly with soft wrist restraints in place.  Yifane informs me that he had been agitated this morning but this was probably due to urinary retention as he calmed after straight cath.  There is no reported history of psychiatric hospitalization.    PAST PSYCHIATRIC HISTORY: Patient carries a diagnosis of dementia    PAST MEDICAL HISTORY: Not obtained    MEDICATIONS:   Current Facility-Administered Medications   Medication Dose Route Frequency Provider Last Rate Last Admin    acetaminophen (TYLENOL) tablet 650 mg  650 mg Oral Q4H PRN Gail Lux APRN   650 mg at 07/09/24 0212    Or    acetaminophen (TYLENOL) 160 MG/5ML oral solution 650 mg  650 mg Oral Q4H PRN Gail Lux APRN        Or    acetaminophen (TYLENOL) suppository 650 mg  650 mg Rectal Q4H PRN Gail Lux APRN        sennosides-docusate (PERICOLACE) 8.6-50 MG per tablet 2 tablet  2 tablet Oral BID PRN Gail Lux APRN        And    polyethylene glycol (MIRALAX) packet 17 g  17 g Oral Daily PRN Gail Lux APRN        And    bisacodyl (DULCOLAX) EC tablet 5 mg  5 mg Oral Daily PRN Gail Lux APRN        And    bisacodyl (DULCOLAX) suppository 10 mg  10 mg Rectal Daily PRN Gail Lux APRN        calcium carbonate (TUMS) chewable tablet 500 mg (200 mg elemental)  2 tablet Oral BID  PRN Gail Lux APRN        cefdinir (OMNICEF) capsule 300 mg  300 mg Oral BID Gail Lux APRN   300 mg at 07/09/24 0920    citalopram (CeleXA) tablet 10 mg  10 mg Oral Daily Gail Lux APRN   10 mg at 07/09/24 0920    Divalproex Sodium (DEPAKOTE SPRINKLE) capsule 125 mg  125 mg Oral TID Bhaskar Duron III, MD        lisinopril (PRINIVIL,ZESTRIL) tablet 20 mg  20 mg Oral Daily Gail Lux APRN   20 mg at 07/09/24 0920    LORazepam (ATIVAN) tablet 0.5 mg  0.5 mg Oral Nightly Gail Lux APRN   0.5 mg at 07/09/24 0212    memantine (NAMENDA) tablet 5 mg  5 mg Oral BID Gail Lux APRN   5 mg at 07/09/24 0920    mirtazapine (REMERON SOL-TAB) disintegrating tablet 15 mg  15 mg Oral Nightly Gail Lux APRN   15 mg at 07/09/24 0212    ondansetron ODT (ZOFRAN-ODT) disintegrating tablet 4 mg  4 mg Oral Q6H PRN Gail Lux APRN        Or    ondansetron (ZOFRAN) injection 4 mg  4 mg Intravenous Q6H PRN Gail Lux APRN        sodium chloride 0.9 % flush 10 mL  10 mL Intravenous Q12H Gail Lux APRN   10 mL at 07/09/24 0217    sodium chloride 0.9 % flush 10 mL  10 mL Intravenous PRN Gail Lux APRN        sodium chloride 0.9 % infusion 40 mL  40 mL Intravenous PRN Gail Lux APRN             ALLERGIES: None    FAMILY HISTORY: Not obtained    SOCIAL HISTORY: The patient has been living at home.  Family is hoping for the patient be placed in a memory care unit with hospice follow-up.    MENTAL STATUS EXAM: The patient is a thin soundly sleeping white male attempts to awaken him unsuccessful.    ASSETS/LIABILITIES: To be assessed    DIAGNOSTIC IMPRESSION: Primary dementia Alzheimer's type with behavioral disturbance    PLAN: I have added Depakote sprinkles to the patient's current medication regimen and will add more aggressive doses of as needed olanzapine for agitation.  Will continue to  follow with you.

## 2024-07-09 NOTE — CONSULTS
[facility] CONSULT      Patient Identification:  NAME:  Ac Green  Age:  79 y.o.   Sex:  male   :  1945   MRN:  [id number]     Chief complaint: urinary retention with hematuria, dementia with agitation    History of present illness:      Ac Green is a 79 y.o. male with a history of dementia with agitation who presented to the ER on 2024. His wife has been having difficulty caring for him at home. The patient has been admitted to [facility] under inpatient hospice care.   Mr. Green has been previously seen by [facility] having undergone a TURP in 2022 by Dr. Meneses. He was stable at his post-operative visit on 2022 but has not kept his follow-up appointments in our office since that time.  Apparently, the patient was having urinary retention and bladder scan this morning showed 627 mL. The patient has undergone intermittent catheterization and the bladder scan this afternoon showed >700 mL. RN was unable to place caruso at bedside and we were asked to see this gentleman in consult for assistance with caruso placement for his urinary retention and for gross hematuria. Hematuria was first noticed after second IO cath attempt. Patient appears very agitated and poor historian. Currently in bilateral wrist restraints. Patient c/o urge to urinate but unable to empty bladder. Family at bedside.    In hospital:  -AVSS  -WBC - 5.74 (6.06)  -Creat - 0.78 (0.83)  -UA - Negative LE, negative nitrites    -CT Report- No suspicious renal mass or hydronephrosis, right renal cyst, generalized bladder wall thickening, prostatomegaly with post-operative changes form partial transurethral resection      Past medical history:  Past Medical History:   Diagnosis Date    Hypertension     Sleep apnea        Past surgical history:  Past Surgical History:   Procedure Laterality Date    PROSTATE SURGERY         Allergies:  Patient has no known allergies.    Home  medications:  Medications Prior to Admission   Medication Sig Dispense Refill Last Dose    cefdinir (OMNICEF) 300 MG capsule Take 1 capsule by mouth 2 (Two) Times a Day.   2024 at 0900    citalopram (CeleXA) 10 MG tablet Take 1 tablet by mouth Daily.       lisinopril (PRINIVIL,ZESTRIL) 20 MG tablet Take 1 tablet by mouth Daily.       LORazepam (ATIVAN) 0.5 MG tablet Take 1 tablet by mouth Every Night.       memantine (NAMENDA) 5 MG tablet Take 1 tablet by mouth 2 (Two) Times a Day.       mirtazapine (REMERON SOL-TAB) 15 MG disintegrating tablet Place 1 tablet on the tongue Every Night.       senna 8.6 MG tablet Take 2 tablets by mouth Daily.           Hospital medications:  cefdinir, 300 mg, Oral, BID  citalopram, 10 mg, Oral, Daily  Divalproex Sodium, 125 mg, Oral, TID  lidocaine, , Topical, Once  lisinopril, 20 mg, Oral, Daily  LORazepam, 0.5 mg, Oral, Nightly  memantine, 5 mg, Oral, BID  mirtazapine, 15 mg, Oral, Nightly  sodium chloride, 10 mL, Intravenous, Q12H           acetaminophen **OR** acetaminophen **OR** acetaminophen    senna-docusate sodium **AND** polyethylene glycol **AND** bisacodyl **AND** bisacodyl    calcium carbonate    OLANZapine    ondansetron ODT **OR** ondansetron    sodium chloride    sodium chloride    Family history:  History reviewed. No pertinent family history.    Social history:  Social History     Tobacco Use    Smoking status: Never     Passive exposure: Never    Smokeless tobacco: Never   Vaping Use    Vaping status: Never Used   Substance Use Topics    Alcohol use: Never    Drug use: Never       Review of systems:      12 point negative except as in HPI    Objective:  TMax 24 hours:   Temp (24hrs), Av.3 °F (36.3 °C), Min:97 °F (36.1 °C), Max:98.2 °F (36.8 °C)      Vitals Ranges:   Temp:  [97 °F (36.1 °C)-98.2 °F (36.8 °C)] 97.1 °F (36.2 °C)  Heart Rate:  [60-86] 84  Resp:  [16-18] 16  BP: (121-157)/() 132/88    Intake/Output Last 3 shifts:  I/O last 3 completed  shifts:  In: 150 [P.O.:150]  Out: -      Physical Exam:    General Appearance:    Alert, agitated    Abdomen:     Soft, NDNT, no masses, no guarding   :    Bladder distended, discomfort with palpation    Neuro/Psych:   Confused     Procedure   Patient and family instructed on need for catheter insertion due to acute urinary retention  Perineal hygiene given  Patient was prepped and draped in sterile fashion.  Lidocaine topical jelly applied directly into urethra  Using sterile technique a 18 f coude catheter was placed.  Balloon inflated with 10 mL of water  Closed drainage system attached  1200 mL of orange urine immediately returned to bag  Catheter secured to leg with strap  Patient tolerated well    Results review:   I reviewed the patient's new clinical results.    Data review:  Lab Results (last 24 hours)       Procedure Component Value Units Date/Time    Basic Metabolic Panel [[id number]]  (Abnormal) Collected: 07/09/24 0618    Specimen: Blood Updated: 07/09/24 0728     Glucose 77 mg/dL      BUN 20 mg/dL      Creatinine 0.78 mg/dL      Sodium 141 mmol/L      Potassium 4.0 mmol/L      Chloride 103 mmol/L      CO2 27.1 mmol/L      Calcium 9.1 mg/dL      BUN/Creatinine Ratio 25.6     Anion Gap 10.9 mmol/L      eGFR 89.6 mL/min/1.73     Narrative:      GFR Normal >60  Chronic Kidney Disease <60  Kidney Failure <15    The GFR formula is only valid for adults with stable renal function between ages 18 and 70.    CBC (No Diff) [[id number]]  (Abnormal) Collected: 07/09/24 0618    Specimen: Blood Updated: 07/09/24 0656     WBC 5.74 10*3/mm3      RBC 3.71 10*6/mm3      Hemoglobin 11.7 g/dL      Hematocrit 35.1 %      MCV 94.6 fL      MCH 31.5 pg      MCHC 33.3 g/dL      RDW 12.2 %      RDW-SD 42.3 fl      MPV 9.0 fL      Platelets 234 10*3/mm3     Comprehensive Metabolic Panel [[id number]]  (Abnormal) Collected: 07/08/24 2226    Specimen: Blood Updated: 07/08/24 2302     Glucose 80 mg/dL      BUN 24 mg/dL       Creatinine 0.83 mg/dL      Sodium 142 mmol/L      Potassium 4.2 mmol/L      Comment: Slight hemolysis detected by analyzer. Result may be falsely elevated.        Chloride 103 mmol/L      CO2 28.0 mmol/L      Calcium 9.7 mg/dL      Total Protein 6.4 g/dL      Albumin 3.7 g/dL      ALT (SGPT) 20 U/L      AST (SGOT) 24 U/L      Comment: Slight hemolysis detected by analyzer. Result may be falsely elevated.        Alkaline Phosphatase 89 U/L      Total Bilirubin 0.5 mg/dL      Globulin 2.7 gm/dL      A/G Ratio 1.4 g/dL      BUN/Creatinine Ratio 28.9     Anion Gap 11.0 mmol/L      eGFR 87.9 mL/min/1.73     Narrative:      GFR Normal >60  Chronic Kidney Disease <60  Kidney Failure <15    The GFR formula is only valid for adults with stable renal function between ages 18 and 70.    CBC & Differential [[id number]]  (Normal) Collected: 07/08/24 2226    Specimen: Blood Updated: 07/08/24 2236    Narrative:      The following orders were created for panel order CBC & Differential.  Procedure                               Abnormality         Status                     ---------                               -----------         ------                     CBC Auto Differential[[id number]]        Normal              Final result                 Please view results for these tests on the individual orders.    CBC Auto Differential [[id number]]  (Normal) Collected: 07/08/24 2226    Specimen: Blood Updated: 07/08/24 2236     WBC 6.06 10*3/mm3      RBC 4.14 10*6/mm3      Hemoglobin 13.1 g/dL      Hematocrit 39.1 %      MCV 94.4 fL      MCH 31.6 pg      MCHC 33.5 g/dL      RDW 12.3 %      RDW-SD 42.7 fl      MPV 8.9 fL      Platelets 239 10*3/mm3      Neutrophil % 67.0 %      Lymphocyte % 23.3 %      Monocyte % 8.1 %      Eosinophil % 1.0 %      Basophil % 0.3 %      Immature Grans % 0.3 %      Neutrophils, Absolute 4.06 10*3/mm3      Lymphocytes, Absolute 1.41 10*3/mm3      Monocytes, Absolute 0.49 10*3/mm3      Eosinophils, Absolute  0.06 10*3/mm3      Basophils, Absolute 0.02 10*3/mm3      Immature Grans, Absolute 0.02 10*3/mm3      nRBC 0.0 /100 WBC     Urinalysis With Microscopic If Indicated (No Culture) - Urine, Clean Catch [[id number]]  (Abnormal) Collected: 07/08/24 2005    Specimen: Urine, Clean Catch Updated: 07/08/24 2028     Color, UA Yellow     Appearance, UA Clear     pH, UA 7.0     Specific Gravity, UA 1.013     Glucose, UA Negative     Ketones, UA Negative     Bilirubin, UA Negative     Blood, UA Negative     Protein, UA Trace     Leuk Esterase, UA Negative     Nitrite, UA Negative     Urobilinogen, UA 1.0 E.U./dL    Narrative:      Urine microscopic not indicated.             Imaging:  Imaging Results (Last 24 Hours)       ** No results found for the last 24 hours. **             Assessment:     Urinary retention  Gross hematuria     Plan:   - No acute urologic surgical intervention recommended. Gross hematuria likely s/t trauma from recent catheterization attempts. Retention likely chronic.   - Start Tamsulosin 0.4 mg QD  - Continue indwelling catheter for now  - Follow-up with Dr. Meneses ([facility]) 1-2 weeks after discharge  - Urology will sign off; please call with any questions/concerns or clinical change

## 2024-07-09 NOTE — PLAN OF CARE
Problem: Adult Inpatient Plan of Care  Goal: Plan of Care Review  Outcome: Ongoing, Progressing  Flowsheets (Taken 7/9/2024 0637)  Progress: no change  Plan of Care Reviewed With:   patient   spouse  Outcome Evaluation: Patient was admitted from ER last night. Patient was uncooperative, agitated, restless and combative. Patient placed on restraints per order. Patient is very confused. Sitter at bedside. Bed alarm on. Family's plan is for patient to go to LTC with Hospice. Continue with plan of care.   Goal Outcome Evaluation:  Plan of Care Reviewed With: patient, spouse        Progress: no change  Outcome Evaluation: Patient was admitted from ER last night. Patient was uncooperative, agitated, restless and combative. Patient placed on restraints per order. Patient is very confused. Sitter at bedside. Bed alarm on. Family's plan is for patient to go to LTC with Hospice. Continue with plan of care.

## 2024-07-10 PROBLEM — E43 SEVERE MALNUTRITION: Status: ACTIVE | Noted: 2024-01-01

## 2024-07-10 PROBLEM — I10 HTN (HYPERTENSION): Status: ACTIVE | Noted: 2024-01-01

## 2024-07-10 NOTE — PLAN OF CARE
Goal Outcome Evaluation: Pt agitated, attempting to pull at lines, restraints initiated again at 1900. Sitter at bedside. Vitals stable. Plan for skilled facility at discharge.

## 2024-07-10 NOTE — PLAN OF CARE
Problem: Adult Inpatient Plan of Care  Goal: Plan of Care Review  Outcome: Ongoing, Progressing  Flowsheets (Taken 7/10/2024 8407)  Progress: no change  Plan of Care Reviewed With:   patient   spouse  Outcome Evaluation: Soft wrist restraints in place. Sitter at bedside. PRN Tylenol given for pain. Spits on staff. Continue with plan of care.   Goal Outcome Evaluation:  Plan of Care Reviewed With: patient, spouse        Progress: no change  Outcome Evaluation: Soft wrist restraints in place. Sitter at bedside. PRN Tylenol given for pain. Spits on staff. Continue with plan of care.

## 2024-07-10 NOTE — PROGRESS NOTES
" LOS: 1 day     Name: Ac Green  Age: 79 y.o.  Sex: male  :  1945  MRN: [id number]         Primary Care Physician: Provider, No Known    Subjective   Subjective  Patient is resting soundly this morning.  Calm, not fighting restraints.  Discussed with sitter at the bedside.    Objective   Vital Signs  Temp:  [96.5 °F (35.8 °C)-97.4 °F (36.3 °C)] 97 °F (36.1 °C)  Heart Rate:  [62-84] 72  Resp:  [16-20] 18  BP: (101-132)/(55-88) 109/71  Body mass index is 16.36 kg/m².    Objective:  General Appearance:  Comfortable and in no acute distress.    Vital signs: (most recent): Blood pressure 109/71, pulse 72, temperature 97 °F (36.1 °C), temperature source Oral, resp. rate 18, height 180.3 cm (71\"), weight 53.2 kg (117 lb 4.6 oz), SpO2 97%.    Lungs:  Normal effort and normal respiratory rate.  He is not in respiratory distress.  There are decreased breath sounds.    Heart: Normal rate.  Regular rhythm.    Abdomen: Abdomen is soft.  Bowel sounds are normal.   There is no abdominal tenderness.     Extremities: There is no dependent edema or local swelling.    Neurological: (Sleeping soundly.  Calm.  Not fighting restraints.).    Skin:  Warm and dry.            : Saunders catheter in place.    Results Review:       I reviewed the patient's new clinical results.    Results from last 7 days   Lab Units 24  0618 24  2226   WBC 10*3/mm3 5.74 6.06   HEMOGLOBIN g/dL 11.7* 13.1   PLATELETS 10*3/mm3 234 239     Results from last 7 days   Lab Units 24  0618 24  2226   SODIUM mmol/L 141 142   POTASSIUM mmol/L 4.0 4.2   CHLORIDE mmol/L 103 103   CO2 mmol/L 27.1 28.0   BUN mg/dL 20 24*   CREATININE mg/dL 0.78 0.83   CALCIUM mg/dL 9.1 9.7   GLUCOSE mg/dL 77 80                 Scheduled Meds:   cefdinir, 300 mg, Oral, BID  citalopram, 10 mg, Oral, Daily  Divalproex Sodium, 125 mg, Oral, TID  lisinopril, 20 mg, Oral, Daily  LORazepam, 0.5 mg, Oral, Nightly  memantine, 5 mg, Oral, BID  mirtazapine, 15 mg, " Oral, Nightly  sodium chloride, 10 mL, Intravenous, Q12H  tamsulosin, 0.4 mg, Oral, Daily      PRN Meds:     acetaminophen **OR** acetaminophen **OR** acetaminophen    senna-docusate sodium **AND** polyethylene glycol **AND** bisacodyl **AND** bisacodyl    calcium carbonate    Morphine    OLANZapine    ondansetron ODT **OR** ondansetron    sodium chloride    sodium chloride  Continuous Infusions:       Assessment & Plan   Active Hospital Problems    Diagnosis  POA    **Dementia with agitation [F03.911]  Yes    Hospice care patient [Z51.5]  Not Applicable      Resolved Hospital Problems   No resolved problems to display.       Assessment & Plan    -Continued to show agitation/aggressive behavior overnight last night but now sleeping soundly.  Psychiatry evaluated yesterday and adjusted medications for his agitation.  -This could have been in part due to urinary retention and Saunders catheter now in place.  He was placed on Omnicef for potential UTI and will leave this in place as well.  Urology evaluated and have signed off.  -He presently has Ativan ordered at bedtime but not on other comfort medications at this time.  -Hospice is following and will follow their lead as well as patient's trajectory with regards to need for hospice scatter bed, transfer to [place] inpatient hospice unit, or if he does improve could consider placement.        Expected Discharge Date: 7/13/2024; Expected Discharge Time:      Delta Montejo MD  [facility]  07/10/24  09:42 EDT

## 2024-07-10 NOTE — PROGRESS NOTES
Visit Form    Ac Green  [id number]  7/10/2024      Patient is a 80yo male with a primary [facility] diagnosis of alzheimer's. Admitted to [facility] for GIP for symptom management of pain and restlessness/mood. Pt was living at home under [facility] services prior to admission    PPS: 30%     IV Medications in 24 hours:  -IV morphine PRN x1    Recommendations:  Continue to monitor for signs of decline and provide comfort measures. Contact [facility] at [phone] with any questions or concerns and at TOD.     -Consider adding a PRN ativan or even scheduled during the day  -Please add pain medication- 5/325mg norco or 5mg SL roxanol PRN      Assessment:  Patient is was sitting up in bed with sitter at bedside. He was agitated and was pulling at his f/c. During the 20 minutes I was in the room pt never calmed/settled. Moprhine was d/cd with only tylenol in its place. Discussed with facility RN who has been using tylenol but pt was complaining of his back hurting. She did take in zyprexa for anxiety.   Discussed with pt spouse that psych made medication changes today and we should give them a chance to evaluate effectiveness but can always add ativan during the day. PT was on 0.5mg ativan qid at home prior to admission.   F/c remains patent and bs active. Heis eating and drinking. Restraints have been off for most of the day, sitter remains at bedside    Collaboration:  Spoke with pts dtr and spouse at the bedside with condition update and support provided. Training provided regarding assessment findings, disease progression, symptom management, recommendations and ELOS. Family v/u of pts condition and all questions were answered. Collaborated with facility RN and  about pts condition and recommendations.     Disposition:  Patient meets GIP criteria d/t frequent administration and continued titration of IV medications to achieve and maintain symptom management. Patient appears to be unsafe for transport  at this time, requiring increasing symptom management and frequent RN assessment. If patient were to stabilize he would require LTC placement d/t increased daily care needs family and [facility] support. Will continue [facility] RN visits to monitor for changes, assess needs and provide support. Please reach out with any questions/concerns. Thank you for allowing us the opportunity to participate in patient's care      Joceline Grace RN  [facility] Bed Team  [phone]

## 2024-07-10 NOTE — PROGRESS NOTES
The patient is sleeping soundly with family at bedside.  No restraint devices are currently in place, but charting indicates that earlier today the patient had spit on staff and become combative.  I have increased his valproic acid to 250 mg 3 times daily given his ongoing problematic behavior.  I have taken the opportunity to discuss the patient's psychiatric prognosis with the patient's daughter indicating that I believe he may be suffering from terminal agitation and that a palliative consult may be warranted.

## 2024-07-11 NOTE — PROGRESS NOTES
" LOS: 2 days     Name: Ac Green  Age: 79 y.o.  Sex: male  :  1945  MRN: [id number]         Primary Care Physician: Provider, No Known    Subjective   Subjective  Continues to have periods of agitation.  Had to be placed back in restraints last night.  Presently sitting up in the bed, reasonably calm, eating breakfast with the assistance of staff.    Objective   Vital Signs  Temp:  [97 °F (36.1 °C)-97.8 °F (36.6 °C)] 97.7 °F (36.5 °C)  Heart Rate:  [64-92] 65  Resp:  [18] 18  BP: (106-157)/(57-81) 157/80  Body mass index is 16.36 kg/m².    Objective:  General Appearance:  Comfortable and in no acute distress.    Vital signs: (most recent): Blood pressure 157/80, pulse 65, temperature 97.7 °F (36.5 °C), temperature source Oral, resp. rate 18, height 180.3 cm (71\"), weight 53.2 kg (117 lb 4.6 oz), SpO2 96%.    Lungs:  Normal effort and normal respiratory rate.    Heart: Normal rate.  Regular rhythm.    Abdomen: Abdomen is soft.  Bowel sounds are normal.   There is no abdominal tenderness.     Extremities: There is no dependent edema or local swelling.    Neurological: Patient is alert.  (Oriented only to self).    Skin:  Warm and dry.                Results Review:       I reviewed the patient's new clinical results.    Results from last 7 days   Lab Units 24  0618 24  2226   WBC 10*3/mm3 5.74 6.06   HEMOGLOBIN g/dL 11.7* 13.1   PLATELETS 10*3/mm3 234 239     Results from last 7 days   Lab Units 24  0618 24  2226   SODIUM mmol/L 141 142   POTASSIUM mmol/L 4.0 4.2   CHLORIDE mmol/L 103 103   CO2 mmol/L 27.1 28.0   BUN mg/dL 20 24*   CREATININE mg/dL 0.78 0.83   CALCIUM mg/dL 9.1 9.7   GLUCOSE mg/dL 77 80                 Scheduled Meds:   cefdinir, 300 mg, Oral, BID  citalopram, 10 mg, Oral, Daily  Divalproex Sodium, 250 mg, Oral, TID  lisinopril, 20 mg, Oral, Daily  LORazepam, 0.5 mg, Oral, Nightly  memantine, 5 mg, Oral, BID  mirtazapine, 15 mg, Oral, Nightly  sodium chloride, 10 mL, " Intravenous, Q12H  tamsulosin, 0.4 mg, Oral, Daily      PRN Meds:     acetaminophen **OR** acetaminophen **OR** acetaminophen    senna-docusate sodium **AND** polyethylene glycol **AND** bisacodyl **AND** bisacodyl    calcium carbonate    HYDROcodone-acetaminophen    Morphine    OLANZapine    ondansetron ODT **OR** ondansetron    sodium chloride    sodium chloride  Continuous Infusions:       Assessment & Plan   Active Hospital Problems    Diagnosis  POA    **Dementia with agitation [F03.911]  Yes    HTN (hypertension) [I10]  Unknown    Severe malnutrition [E43]  Yes    Hospice care patient [Z51.5]  Not Applicable      Resolved Hospital Problems   No resolved problems to display.       Assessment & Plan    -Psychiatry adjusting his medications for agitation.  Agree that this is most likely a terminal state for him.  Will ask the palliative care service to assist with goals of care discussion with the patient's family.  Hospice following as well.  -Continue Saunders catheter for acute urinary retention identified in the emergency room.  -I do not see any indication for antibiotics and will stop the Omnicef today.  -Hospice is following and will follow their lead as well as patient's trajectory with regards to need for hospice scatter bed, transfer to [place] inpatient hospice unit, or if he does improve could consider placement.      Expected Discharge Date: 7/13/2024; Expected Discharge Time:      Delta Montejo MD  [facility]  07/11/24  09:36 EDT

## 2024-07-11 NOTE — PROGRESS NOTES
Visit Form     Ac Green  [id number]  7/11/2024        Patient is a 80yo male with a primary [facility] diagnosis of alzheimer's. Admitted to [facility] for GIP for symptom management of pain and restlessness/mood. Pt was living at home under [facility] services prior to admission     PPS: 30%             Symptom management  Medications in 24 hours:  -IV morphine 2mg PRN x 1  -IM Zyprexa PRN  x2  -PP norco 5/325 PRN x1        Recommendations:  Continue to monitor for signs of decline and provide comfort measures. Contact [facility] at [phone] with any questions or concerns and at TOD.      Dr Gongora with [facility] called for recommendation as pt behavior has not calmed and he is now back in restraints after spitting kicking and hitting staff. Per sitter he was even spitting out his medications this am.     -PO aitvan 0.5mg TID routine and 0.5mg q3h PRN        Assessment:  Pt was sleeping when this nurse entered room he is back in restraints and sitter at bedside. She reported that he had a rouch night and did not sleep well at all. After breakfast he became agitated yelling, spitting and hitting/kicking staff.     F/c remains patent and bs active. Heis eating and drinking.      Collaboration:  Call to pt spouse with no answer. These medication changes were discussed with her yesterday. Collaborated with facility RN and  about pts condition and recommendations.      Disposition:  Patient meets GIP criteria d/t frequent administration and continued titration of IV medications to achieve and maintain symptom management. Patient appears to be unsafe for transport at this time, requiring increasing symptom management and frequent RN assessment. If patient were to stabilize he would require LTC placement d/t increased daily care needs family and [facility] support. Will continue [facility] RN visits to monitor for changes, assess needs and provide support. Please reach out with any questions/concerns.  Thank you for allowing us the opportunity to participate in patient's care        Joceline Grace RN  [facility] Bed Team  [phone]

## 2024-07-11 NOTE — PROGRESS NOTES
Purpose of the visit was to evaluate for: goals of care/advanced care planning. Spoke with patient and family and discussed goals of care.      Assessment:  Patient is palliative care appropriate for inpatient care given (list diagnosis/symptoms):  dementia with agitation. Patient alert, confused, and remains agitated at time of visit today. PPS 30%.    Recommendations/Plan: Continued support and education with goals of care. Son, Govind, would like to speak with a physician regarding recommendations.    Other Comments: GRETCHEN met with patient's wife, Mel, along with patient's brother at bedside. Explained role and purpose of visit to help establish goals of care. Mel shared that she and the patient have been together for eight years. Recently, patient enrolled in hospice due to progression of his dementia and worsening behavior concerns. Mel spoke of conversations with the hospice team regarding patient's condition and voiced understanding that he may be experiencing terminal agitation. She stated that she and patient's daughter have been researching about patient's condition to have a better understanding. Mel shared that the original goal was to transition patient to a LTC facility but is now unsure, as she feels that patient may need more aggressive symptom management. Processed concerns and provided support. Mel feels that patient's son, Govind, may need additional support and education, as he requested this hospitalization for a second opinion rather than transition to the hospice inpatient unit.    GRETCHEN spoke with patient's son, Govind, over the phone this afternoon per Mel's request. Added Govind's number to treatment team sticky note ([phone]). Updated Govind to patient's condition and answered questions as able. Govind requests to speak with patient's attending physician for his opinion and recommendations on patient's current condition. Secure chat sent to Dr. Montejo with Govind's  request. Palliative team will continue to follow with support for decision-making.

## 2024-07-11 NOTE — PROGRESS NOTES
The patient's agitation persists in spite of addition of Depakote.  I will add scheduled Zyprexa continuing as needed Zyprexa but continued to suspect that we are seeing symptoms of terminal agitation in a patient with end-stage Alzheimer's and am in agreement with the palliative consultation.

## 2024-07-11 NOTE — PLAN OF CARE
Goal Outcome Evaluation:  Plan of Care Reviewed With: patient     Progress: no change    A&O to self only. Remains in bilateral soft wrist restraints. Sitter at bedside d/t pt pulling on caruso catheter. Administered pain medication 2X and Zyprexa 1X for agitation and restlessness. Compliant with meds crushed in chocolate ice cream. Will continue with plan of care.

## 2024-07-12 NOTE — PROGRESS NOTES
Ac Green  [id number]  7/12/2024        Patient is a 78yo male with a primary [facility] diagnosis of alzheimer's. Admitted to [facility] for GIP for symptom management of pain and terminal restlessness. Pt was living at home under [facility] services prior to admission     PPS: 30%             Symptom management  Medications in 24 hours:  -IV morphine 2mg PRN x 1  -PO ativan 0.5mg TID  -PO norco 5/325 PRN x2        Recommendations:  Continue to monitor for signs of decline and provide comfort measures. Contact [facility] at [phone] with any questions or concerns and at TOD.         Assessment:  Pt was sleeping in restraints and with a sitter at bedside this am. Wife was also present. Discussed medical POA Per documents in EPIC that say Kush Kathleen and Chung are pt healthcare surrogate. She was under the I'm[pression that this was just for blood products. Explained that it was for full medical decision making. Call was placed to both Kush and Chung multiple time. VM left for both to please call this nurse back or call the hospital so we can ensure GOC. As family is concerned pt spouse and kids are in agreement with comfort measures and the attending had just updated [facility] SW that son in [place] is now on board with full comfort for this declining pt.   We would need to hold on total comfort medications until GOC are verified with healthcare surrogate.      Pt was warm to touch with f/c patent. He is still having periods of agitation and is still requiring restraints and a sitter. PO ativan appears to be effective at this time.      --Call back from Kush Kathleen who was updated on pt condition and he was in agreement with family that comfort is the main goal and they do not want him to suffer.         Collaboration:  Lenghtly discussion with spouse at [facility]. Collaboration with Ta Howell and VALERIA and update to attending.  Collaborated with facility RN and  about pts condition and recommendations.       Disposition:  Patient meets GIP criteria d/t frequent administration and continued titration of IV medications to achieve and maintain symptom management. Patient appears to be unsafe for transport at this time, requiring increasing symptom management and frequent RN assessment. If patient were to stabilize he would require LTC placement d/t increased daily care needs family and [facility] support. Will continue [facility] RN visits to monitor for changes, assess needs and provide support. Please reach out with any questions/concerns. Thank you for allowing us the opportunity to participate in patient's care        Joceline Grace RN  [facility] Bed Team  [phone]

## 2024-07-12 NOTE — PLAN OF CARE
Goal Outcome Evaluation:  Plan of Care Reviewed With: patient    Progress: no change    Pt was restless at beginning of shift. Administered Norco 1x with HS medication. Pt slept the rest of shift. Will continue with plan of care.

## 2024-07-12 NOTE — PROGRESS NOTES
Palliative Social Work Note    Purpose of visit: Follow up  Assessment: Patient unresponsive and resting comfortably at time of visit. PPS 30%.  Support System: Family, Spouse, and [employer]  Psychosocial Needs Identified: Caregiver support  Plan/Other Comments:       SW met with patient's wife, Mel, at bedside this afternoon. Reviewed decision to focus on comfort measures and symptom management for patient. Mel glad that all family members are now on board with comfort after Dr. Montejo's conversation with patient's son, Govind, this morning. She spoke of the steady decline she has seen from patient over the past few years and knows that he would want to be made comfortable. Support provided. Care coordination with Dr. Montejo, [facility] CATALINA Car, and bedside RN Radha. Palliative team will remain available for continued support moving forward.

## 2024-07-12 NOTE — PROGRESS NOTES
" LOS: 3 days     Name: Ac Green  Age: 79 y.o.  Sex: male  :  1945  MRN: [id number]         Primary Care Physician: Provider, No Known    Subjective   Subjective  Remains confused with periods of aggression, particularly at nighttime.    Objective   Vital Signs  Temp:  [97.7 °F (36.5 °C)-98.2 °F (36.8 °C)] 98.2 °F (36.8 °C)  Heart Rate:  [66-81] 75  Resp:  [18-20] 18  BP: (104-173)/(62-79) 173/79  Body mass index is 16.36 kg/m².    Objective:  General Appearance:  Comfortable and in no acute distress.    Vital signs: (most recent): Blood pressure 173/79, pulse 75, temperature 98.2 °F (36.8 °C), temperature source Oral, resp. rate 18, height 180.3 cm (71\"), weight 53.2 kg (117 lb 4.6 oz), SpO2 100%.    Lungs:  Normal effort and normal respiratory rate.  He is not in respiratory distress.  There are decreased breath sounds.    Heart: Normal rate.  Regular rhythm.    Abdomen: Abdomen is soft.  Bowel sounds are normal.   There is no abdominal tenderness.     Extremities: There is no dependent edema or local swelling.    Neurological: Patient is alert and oriented to person, place and time.    Skin:  Warm and dry.                Results Review:       I reviewed the patient's new clinical results.    Results from last 7 days   Lab Units 24  0618 24  2226   WBC 10*3/mm3 5.74 6.06   HEMOGLOBIN g/dL 11.7* 13.1   PLATELETS 10*3/mm3 234 239     Results from last 7 days   Lab Units 24  0618 24  2226   SODIUM mmol/L 141 142   POTASSIUM mmol/L 4.0 4.2   CHLORIDE mmol/L 103 103   CO2 mmol/L 27.1 28.0   BUN mg/dL 20 24*   CREATININE mg/dL 0.78 0.83   CALCIUM mg/dL 9.1 9.7   GLUCOSE mg/dL 77 80                 Scheduled Meds:   citalopram, 10 mg, Oral, Daily  Divalproex Sodium, 250 mg, Oral, TID  lisinopril, 20 mg, Oral, Daily  LORazepam, 0.5 mg, Oral, TID  memantine, 5 mg, Oral, BID  mirtazapine, 15 mg, Oral, Nightly  OLANZapine, 2.5 mg, Oral, BID  sodium chloride, 10 mL, Intravenous, " Q12H  tamsulosin, 0.4 mg, Oral, Daily      PRN Meds:     acetaminophen **OR** acetaminophen **OR** acetaminophen    senna-docusate sodium **AND** polyethylene glycol **AND** bisacodyl **AND** bisacodyl    calcium carbonate    HYDROcodone-acetaminophen    LORazepam    Morphine    OLANZapine    ondansetron ODT **OR** ondansetron    sodium chloride    sodium chloride  Continuous Infusions:       Assessment & Plan   Active Hospital Problems    Diagnosis  POA    **Dementia with agitation [F03.911]  Yes    HTN (hypertension) [I10]  Unknown    Severe malnutrition [E43]  Yes    Hospice care patient [Z51.5]  Not Applicable      Resolved Hospital Problems   No resolved problems to display.       Assessment & Plan    -Palliative care has conducted a family discussion.  I discussed with the patient's son over the phone today.  I informed him of the fact that we feel the patient is exhibiting terminal dementia with significant confusion and agitation.  I recommended transition to full comfort measures, to which he is agreeable.  I have informed the palliative care team of this discussion and they are going to continue on with family conference.  Will wait until family is in full agreement and has given formal consent to transition to comfort care.  -Continue Saunders catheter for acute urinary retention identified in the emergency room.        Expected Discharge Date: 7/13/2024; Expected Discharge Time:      Delta Montejo MD  [facility]  07/12/24  11:59 EDT    Addendum: Have received word from hospice and palliative care service that all family decision makers are now in agreement to proceed with full comfort measures.  Will institute palliative care order set with comfort medications and eliminate all life-prolonging measures.

## 2024-07-12 NOTE — PROGRESS NOTES
Critical Care Team - Daily Progress Note         Date and time: 2/10/2019 11:46 AM  Patient's name:  Yash York  Medical Record Number: [id number]  Patient's account/billing number: [de-identified]  Patient's YOB: 1950  Age: 77 y.o. Date of Admission: 2/6/2019  3:25 AM  Length of stay during current admission: 4    Primary Care Physician: Shirley Armenta DO  ICU Attending Physician: Dr. Rhett Williamson     Code Status: Full Code     Reason for ICU admission: Respiratory failure*       SUBJECTIVE:   77 y.o. female with significant past medical history of COPD, CHF, hypertension, who presents to the emergency department with soreness of breath. Patient was febrile at home. Had increasing shortness of breath in spite of breathing treatments at home. In the emergency department she was hypotensive and deemed to be septic. She had central line placed and Levophed was initiated. Patient was also intubated secondary to hypercapnia. She denies nausea/vomiting or diarrhea.  Patient was transferred to the ICU for further management for septic shock     OVERNIGHT EVENTS:     More awake when fentanyl is decreased  CURRENT VENTILATION STATUS:   [x] Ventilator       [] BIPAP            [] Nasal Cannula           [] Room Air    IF INTUBATED, ET TUBE MARKING AT LOWER LIP:       cms  SECRETIONS Amount:         [x] Small  [] Moderate                    [] Large  [x] None               Color:              [] White  [] Colored                      [] Bloody  SEDATION:    RAAS Score:  [] Propofol gtt                 [] Versed gtt                   [] Ativan gtt                    [] No Sedation   Precedex   PARALYZED:             [x] No                               [] Yes  VASOPRESSORS:    [x] No                               [] Yes    If yes - [] Levophed           [] Dopamine             [] Vasopressin       [] Dobutamine  [] Phenylephrine         [] Epinephrine  CENTRAL LINES:     [] No                   [x] The patient is sleeping soundly today.  He continues to have a sitter with restraints in place but sitter reports no management issues overnight.  Family is making final decisions regarding possible referral for palliative care.   Yes   (Date of Insertion:   )           If yes -     [x] Right IJ     [] Left IJ [] Right Femoral            [] Left Femoral day 4                   [] Right Subclavian   [] Left Subclavian                      [] PICC Line  TURNER'S CATHETER:   [] No              [x] Yes  (Date of Insertion:   )   URINE OUTPUT:            [x] Good                     [] Low              [] Anuric     OBJECTIVE:     VITAL SIGNS:  BP (!) 147/90   Pulse 89   Temp 98.4 °F (36.9 °C) (Core)   Resp 21   Ht 5' (1.524 m)   Wt 129 lb 6.6 oz (58.7 kg)   SpO2 98%   BMI 25.27 kg/m²   Tmax over 24 hours:  Temp (24hrs), Av.1 °F (37.3 °C), Min:98.4 °F (36.9 °C), Max:99.7 °F (37.6 °C)      Patient Vitals for the past 6 hrs:   BP Temp Temp src Pulse Resp SpO2   02/10/19 1100 (!) 147/90 - - 89 21 98 %   02/10/19 1000 113/69 - - 81 24 94 %   02/10/19 0900 133/82 - - 102 23 92 %   02/10/19 0800 (!) 190/90 98.4 °F (36.9 °C) CORE 115 26 93 %   02/10/19 0700 (!) 177/112 - - 86 27 94 %   02/10/19 0600 (!) 156/99 - - 94 13 96 %         Intake/Output Summary (Last 24 hours) at 02/10/19 1146  Last data filed at 02/10/19 0749   Gross per 24 hour   Intake             1223 ml   Output             1810 ml   Net             -587 ml     Wt Readings from Last 2 Encounters:   02/10/19 129 lb 6.6 oz (58.7 kg)   18 121 lb (54.9 kg)     Body mass index is 25.27 kg/m².         PHYSICAL EXAMINATION:    General appearance - Sedated and in no distress  Mental status - sedated   Eyes - pupils equal and reactive, extraocular eye movements intact  Ears - external ear canals normal  Nose - normal and patent,, discharge oral endotracheal tube or G-tube  Mouth - mucous membranes moist, pharynx normal without lesions  Neck - supple, no significant adenopathy, JVD  Chest - clear to auscultation, no wheezes, rales or rhonchi, symmetric air entry  Heart - normal rate, regular rhythm, normal S1, S2, no murmurs, rubs, clicks or gallops  Abdomen - soft, nontender, Labs      02/10/19   0604  02/10/19   1045   PH  7.372  7.403   PCO2  51.6*   --    PO2  70.7   --    HCO3  29.3*   --    BE  3.1*  4.4*   O2SAT  93.6  91.9*       Laboratory findings:  Complete Blood Count: Recent Labs      02/08/19   0645  02/09/19   0636  02/10/19   0540   WBC  12.5*  19.2*  15.9*   HGB  11.0*  11.5  11.3*   HCT  34.9  38.0  38.3   PLT  251  256  234        Last 3 Blood Glucose:   Recent Labs      02/08/19   0645  02/09/19   0636  02/10/19   0540   GLUCOSE  211*  162*  132*        PT/INR:    Lab Results   Component Value Date    PROTIME 14.5 02/06/2019    INR 1.3 02/06/2019     PTT:    Lab Results   Component Value Date    APTT 31.7 02/06/2019       Comprehensive Metabolic Profile:   Recent Labs      02/08/19   0645  02/09/19   0636  02/10/19   0540   NA  143  142  144   K  3.6  4.8  4.8   CL  111*  109*  108*   CO2  22  24  29   BUN  39*  43*  41*   CREATININE  0.8  0.7  0.6   GLUCOSE  211*  162*  132*   CALCIUM  8.8  8.7  8.5*   PROT  5.8*  6.0*  5.5*   LABALBU  2.2*  2.5*  2.5*   BILITOT  <0.2  <0.2  <0.2   ALKPHOS  85  88  80   AST  20  19  15   ALT  9  11  12      Magnesium:   Lab Results   Component Value Date    MG 2.3 02/10/2019     Phosphorus:   Lab Results   Component Value Date    PHOS 3.2 02/10/2019     Ionized Calcium: No results found for: CAION     Urinalysis:     Troponin: No results for input(s): TROPONINI in the last 72 hours.     Microbiology:    Cultures during this admission:       Blood cultures:                  [] None drawn      [] Negative     []  Positive     [] Pending   Urine Culture:                    [] None drawn      [] Negative     []  Positive     [] Pending  Sputum Culture:                [] None drawn      [] Negative     [x]  Positive     [] Pending Haemophilus influenza  Endotracheal aspirate:      [] None drawn      [] Negative     []  Positive     [] Pending  Stool:    [] None Sent        [] Negative     []  Positive     [] Pending   Other Micro:   [] None Sent        [] Negative     []  Positive     [] Pending     Other pertinent Labs:       Radiology/Imaging:       ASSESSMENT:      27-year-old lady with history of COPD tobacco use hypertension has been having increasing shortness of breath the last 2 days presents to ER on 2/6  With cough being treated Robitussin over-the-counter  patient was turning lethargic and blue.  With respiratory distress        Patient was intubated in ER   Chest film showing basilar infiltrates  Influenza negative  2/7  Hypercapnic on pressors  2/8 off norepinephrine this morning  2/9 attempted to stop fentanyl attempted CPAP trial blood gases showing respiratory acidosis  Patient tachycardic at 120Started on metoprolol     1. Hypercapnic respiratory failure on mechanical ventilator   2. Haemophilus pneumonia on Rocephin  3. COPD exacerbation  4. Chronic back pain   5. History of chronic tension type headaches headaches for which she takes fiorinal regularly up to 3 times a day (opiates and barbiturates + screen)  6. Opiate dependence has been receiving narcotics since 2016  7. 2/6 Echo showing EF 60% RSVP 64  8. Nicotine addiction   9. Elevated proBNP  10. Hypertension           PLAN:      Blood gases acceptable on CPAP  Wean down Precedex  Plan to extubate today  Keep BiPAP at side       WEAN PER PROTOCOL:                  [] No                   [x] Yes                 [] N/A  DISCONTINUE ANY LABS:              [x] No                   [] Yes  ICU PROPHYLAXIS:  Stress ulcer:  [x] PPI Agent                   [] [id number]          [] Sucralfate                   [] Other:  VTE:                [x] Enoxaparin                 [] Unfract.  Heparin Subcut                    [] EPC Cuffs  NUTRITION:  [] NPO    [x] Tube Feeding (Specify: )       [] TPN                [] PO (Diet: DIET TUBE FEED CONTINUOUS/CYCLIC NPO; STANDARD WITHOUT FIBER; Nasogastric; Continuous; 20; 50)  HOME MEDICATIONS RECONCILED:        [x] No                               [] Yes  INSULIN DRIP:                                               [x] No                               [] Yes  CONSULTATION NEEDED:                          [x] No                               [] Yes  FAMILY UPDATED:                                       [] No                               [x] Yes  TRANSFER OUT OF ICU:                             [x] No                               [] Yes        SUE Peralta                     I personally saw, examined and provided care for the patient. Radiographs, labs and medication list were reviewed by me independently. I spoke with bedside nursing, therapists and consultants. Critical care services and times documented are independent of procedures and multidisciplinary rounds with Residents.  Additionally comprehensive, multidisciplinary rounds were conducted with the MICU team. The case was discussed in detail and plans for care were established.       2/10/2019, 11:46 AM  > 35 min

## 2024-07-13 NOTE — PROGRESS NOTES
" LOS: 4 days     Name: Ac Green  Age: 79 y.o.  Sex: male  :  1945  MRN: [id number]         Primary Care Physician: Provider, No Known    Subjective   Subjective  Oriented only to person.  Still requiring soft wrist restraints and sitter.    Objective   Vital Signs  Temp:  [96.1 °F (35.6 °C)-98.1 °F (36.7 °C)] 97.6 °F (36.4 °C)  Heart Rate:  [62-75] 72  Resp:  [16-18] 16  BP: ()/(57-74) 120/65  Body mass index is 16.36 kg/m².    Objective:  General Appearance:  Comfortable and in no acute distress (Chronically ill-appearing).    Vital signs: (most recent): Blood pressure 120/65, pulse 72, temperature 97.6 °F (36.4 °C), temperature source Oral, resp. rate 16, height 180.3 cm (71\"), weight 53.2 kg (117 lb 4.6 oz), SpO2 96%.    Lungs:  Normal effort and normal respiratory rate.    Heart: Normal rate.  Regular rhythm.    Abdomen: Abdomen is soft.  Bowel sounds are normal.   There is no abdominal tenderness.     Extremities: There is no dependent edema or local swelling.    Neurological: (Oriented to person only.  Restless.  In soft wrist restraints.).    Skin:  Warm and dry.                Results Review:       I reviewed the patient's new clinical results.    Results from last 7 days   Lab Units 24  0618 24  2226   WBC 10*3/mm3 5.74 6.06   HEMOGLOBIN g/dL 11.7* 13.1   PLATELETS 10*3/mm3 234 239     Results from last 7 days   Lab Units 24  0618 24  2226   SODIUM mmol/L 141 142   POTASSIUM mmol/L 4.0 4.2   CHLORIDE mmol/L 103 103   CO2 mmol/L 27.1 28.0   BUN mg/dL 20 24*   CREATININE mg/dL 0.78 0.83   CALCIUM mg/dL 9.1 9.7   GLUCOSE mg/dL 77 80                 Scheduled Meds:   Divalproex Sodium, 250 mg, Oral, TID  LORazepam, 0.5 mg, Oral, TID  mirtazapine, 15 mg, Oral, Nightly  OLANZapine, 2.5 mg, Oral, BID      PRN Meds:     acetaminophen **OR** acetaminophen **OR** acetaminophen    senna-docusate sodium **AND** polyethylene glycol **AND** bisacodyl **AND** bisacodyl    " diphenoxylate-atropine    glycopyrrolate **OR** glycopyrrolate **OR** glycopyrrolate **OR** glycopyrrolate    haloperidol **OR** haloperidol **OR** haloperidol lactate    haloperidol **OR** haloperidol **OR** haloperidol lactate    Morphine **OR** morphine **OR** HYDROmorphone    Morphine **OR** morphine **OR** HYDROmorphone    Morphine **OR** morphine **OR** HYDROmorphone    ketorolac    LORazepam **OR** LORazepam **OR** LORazepam    LORazepam **OR** LORazepam **OR** LORazepam    LORazepam **OR** LORazepam **OR** LORazepam    ondansetron ODT **OR** ondansetron    Polyvinyl Alcohol-Povidone PF    sodium chloride    sodium chloride  Continuous Infusions:       Assessment & Plan   Active Hospital Problems    Diagnosis  POA    **Dementia with agitation [F03.911]  Yes    HTN (hypertension) [I10]  Unknown    Severe malnutrition [E43]  Yes    Hospice care patient [Z51.5]  Not Applicable      Resolved Hospital Problems   No resolved problems to display.       Assessment & Plan    -After extensive discussions with family yesterday patient has been transition to full comfort measures.  Still able to take oral medications so I have left several of these in place to assist with control of his agitation and restlessness  -At this point moving forward we will see the trajectory of his symptoms and extent of his oral intake.  -Hospice following    Dispo  To be determined based upon clinical trajectory moving forward.  Currently does not appear appropriate for transfer to another facility    Expected Discharge Date: 7/13/2024; Expected Discharge Time:      Delta Montejo MD  [facility]  07/13/24  08:22 EDT

## 2024-07-13 NOTE — PROGRESS NOTES
"                Ac Green  [id number]  7/13/2024        Patient is a 80yo male with a primary [facility] diagnosis of alzheimer's. Admitted to [facility] for GIP for symptom management of pain and terminal restlessness. Pt was living at home under [facility] services prior to admission     PPS: 20%             Symptom management  Medications in 24 hours:  -IV morphine 2mg PRN x 0  -IV dialudid 0.5mg PRN x1  -IV toradol 15mg PRN x1  -PO ativan 0.5mg TID    Recommendations:  Continue to monitor for signs of decline and provide comfort measures. Contact [facility] at [phone] with any questions or concerns and at TOD.         Assessment:  Pt was in bed with sitter at bedside and restraints continued. He appeared comfortable today and during assessment only had a few movements but his eyes remained closed and he calmed recently. Lungs clear on ra. F/c patent and hypoactive b/s. He continues to eat but down to bites and sips. He is still tolerating po ativan but is requiring IV intervention for symptom management.     It was expressed that dtr had reservations about medicating pt and that \"we are going to kill him\". This nurse spent time in room with pt spouse and discussed how we manage symptoms but are not giving him medication to expedite his transition but rather to keep his symptoms under control while his body goes through the natural transition. She VU and shared that pt dtr had reached out to her and spouse shared information that we had discussed. Dtr it to visit again today and spouse will share information from our visit with her to hopefully put her at ease.     Goal continues to be comfort, one son to arrive Sunday, one son to arrive Monday. Discussed that we will continue to medicate pt as needed for optimal symptom management and that ELOS hours to day more likely days at this time.      Collaboration:  Lenghtly discussion with spouse at [facility]. Collaborated with facility RN about pts condition and " recommendations.      Disposition:  Patient meets GIP criteria d/t frequent administration and continued titration of IV medications to achieve and maintain symptom management. Patient appears to be unsafe for transport at this time, requiring increasing symptom management and frequent RN assessment. If patient were to stabilize he would require LTC placement d/t increased daily care needs family and [facility] support. Will continue [facility] RN visits to monitor for changes, assess needs and provide support. Please reach out with any questions/concerns. Thank you for allowing us the opportunity to participate in patient's care        Joceline Grace RN  [facility] Bed Team  [phone]

## 2024-07-13 NOTE — PLAN OF CARE
Goal Outcome Evaluation:  Plan of Care Reviewed With: patient        Progress: no change  Outcome Evaluation: Pt restless, oriented to self only.   Soft wrist restraints remain, sitter at bedside for safety.  PRN tordadol given x1 for complaints of neck pain.

## 2024-07-14 NOTE — PROGRESS NOTES
[facility] Visit Report      Admission R/T [facility] Dx: Yes    [facility] with [facility]    Review of Visit:      PPS: 20    VS:   BP-188/106  Taken later in usg873/79  P- 81  Temp-96.5  RR- 16  O2-100 RA    Comfort Medications in 24 hours:   At time of note has required no PRN medications.  Maintaining comfort and agitation with PO routine medications.    Recommendations:  Continue to monitor for signs of decline and provide comfort measures. Contact [facility] at [phone] with any questions or concerns and at TOD.     Assessment: Pt seen this day for routine SB visit. Pt laying in bed on back with HOB elevated. No s/s of pain or distress. Pt appears to be sleeping and did not respond to this nurse during visit. Pt did not appear to have any s/s of pain, distress, trouble breathing or agitation. Soft restraints remain. Spouse and pts brother at bedside. Sitter remains from facility. Son from [place] to arrive today. Family reports pt eating only bites and sips. 25-50% of meals over a cpl hours. Will continue to follow daily and PRN.    Disposition: Pt to remain at facility until EOL. Pt is requiring frequent RN assessments as well as IV medication administration and titration. Should condition stabilize pt will require SNF for increased titrations and administration of medications to remain comfortable.       Mel Roberson LPN  [facility] Visit Nurse  Scattered Bed Team

## 2024-07-14 NOTE — PROGRESS NOTES
" LOS: 5 days     Name: Ac Green  Age: 79 y.o.  Sex: male  :  1945  MRN: [id number]         Primary Care Physician: Provider, No Known    Subjective   Subjective  Requiring intermittent doses of IV Ativan.  This morning he is somnolent and calm.  No acute overnight events noted    Objective   Vital Signs  Temp:  [97.1 °F (36.2 °C)-97.3 °F (36.3 °C)] 97.1 °F (36.2 °C)  Heart Rate:  [68-85] 69  Resp:  [16] 16  BP: ()/(60-92) 172/92  Body mass index is 16.36 kg/m².    Objective:  General Appearance:  Comfortable, in no acute distress and ill-appearing (Terminally ill-appearing).    Vital signs: (most recent): Blood pressure 172/92, pulse 69, temperature 97.1 °F (36.2 °C), temperature source Axillary, resp. rate 16, height 180.3 cm (71\"), weight 53.2 kg (117 lb 4.6 oz), SpO2 98%.    Lungs:  Normal effort and normal respiratory rate.  He is not in respiratory distress.  There are decreased breath sounds.    Heart: Normal rate.  Regular rhythm.    Abdomen: Abdomen is soft.  Bowel sounds are normal.   There is no abdominal tenderness.     Extremities: There is no dependent edema or local swelling.    Neurological: (End-stage dementia with terminal agitation).    Skin:  Warm and dry.                Results Review:       I reviewed the patient's new clinical results.    Results from last 7 days   Lab Units 24  0618 24  2226   WBC 10*3/mm3 5.74 6.06   HEMOGLOBIN g/dL 11.7* 13.1   PLATELETS 10*3/mm3 234 239     Results from last 7 days   Lab Units 24  0618 24  2226   SODIUM mmol/L 141 142   POTASSIUM mmol/L 4.0 4.2   CHLORIDE mmol/L 103 103   CO2 mmol/L 27.1 28.0   BUN mg/dL 20 24*   CREATININE mg/dL 0.78 0.83   CALCIUM mg/dL 9.1 9.7   GLUCOSE mg/dL 77 80                 Scheduled Meds:   Divalproex Sodium, 250 mg, Oral, TID  LORazepam, 0.5 mg, Oral, TID  mirtazapine, 15 mg, Oral, Nightly  OLANZapine, 2.5 mg, Oral, BID      PRN Meds:     acetaminophen **OR** acetaminophen **OR** " acetaminophen    senna-docusate sodium **AND** polyethylene glycol **AND** bisacodyl **AND** bisacodyl    diphenoxylate-atropine    glycopyrrolate **OR** glycopyrrolate **OR** glycopyrrolate **OR** glycopyrrolate    haloperidol **OR** haloperidol **OR** haloperidol lactate    haloperidol **OR** haloperidol **OR** haloperidol lactate    Morphine **OR** morphine **OR** HYDROmorphone    Morphine **OR** morphine **OR** HYDROmorphone    Morphine **OR** morphine **OR** HYDROmorphone    ketorolac    LORazepam **OR** LORazepam **OR** LORazepam    LORazepam **OR** LORazepam **OR** LORazepam    LORazepam **OR** LORazepam **OR** LORazepam    ondansetron ODT **OR** ondansetron    Polyvinyl Alcohol-Povidone PF    sodium chloride    sodium chloride  Continuous Infusions:       Assessment & Plan   Active Hospital Problems    Diagnosis  POA    **Dementia with agitation [F03.911]  Yes    HTN (hypertension) [I10]  Unknown    Severe malnutrition [E43]  Yes    Hospice care patient [Z51.5]  Not Applicable      Resolved Hospital Problems   No resolved problems to display.       Assessment & Plan    -Continue comfort measures.  He is slowly declining.  Eating minimal amounts at this point.  Terminal restlessness and agitation appears reasonably controlled with use of oral medications and as needed IV Ativan.  -Hospice scattered bed        Expected Discharge Date: 7/13/2024; Expected Discharge Time:      Delta Montejo MD  [facility]  07/14/24  08:11 EDT

## 2024-07-14 NOTE — PLAN OF CARE
Goal Outcome Evaluation:  Plan of Care Reviewed With: spouse, daughter, son        Progress: declining  Outcome Evaluation: Pt confused x 4. Sitter at bedside. Non-violent restraints continued d/t pt pulling at F/C and IV lines. Pt moderately agitated this AM and later afternoon. Scheduled 0.5mg Ativan given with moderate effect. Pt eating bites of meals when awake. Family at bedside throughout day. Family refusing PRN medications at this time. Pt likely to benefit from PRN comfort medications.

## 2024-07-14 NOTE — PLAN OF CARE
Goal Outcome Evaluation:  Plan of Care Reviewed With: patient        Progress: no change  Outcome Evaluation: Scheduled meds taken whole with applesauce.  0.5mg ativan given x1 for agitation.  Sitter remains at bedside.  Will continue with plan of care.

## 2024-07-15 PROBLEM — G30.1 ALZHEIMER'S DEMENTIA, LATE ONSET, WITH BEHAVIORAL DISTURBANCE: Status: ACTIVE | Noted: 2024-01-01

## 2024-07-15 PROBLEM — F02.818 ALZHEIMER'S DEMENTIA, LATE ONSET, WITH BEHAVIORAL DISTURBANCE: Status: ACTIVE | Noted: 2024-01-01

## 2024-07-15 PROBLEM — I67.9 CEREBROVASCULAR SMALL VESSEL DISEASE: Status: ACTIVE | Noted: 2024-01-01

## 2024-07-15 NOTE — PROGRESS NOTES
Case Management Discharge Note                Selected Continued Care - Admitted Since 2024       Destination Coordination complete.      Service Provider Selected Services Address Phone Fax Patient Preferred    [facility] [address], [place] [id number] [phone] --              Durable Medical Equipment    No services have been selected for the patient.                Dialysis/Infusion    No services have been selected for the patient.                Home Medical Care    No services have been selected for the patient.                Therapy    No services have been selected for the patient.                Community Resources    No services have been selected for the patient.                Community & DME    No services have been selected for the patient.                         Final Discharge Disposition Code: 41 -  in medical facility

## 2024-07-15 NOTE — H&P
Palliative Care/Hospice Admit/Consult Note     Referring Provider: Delta Montejo MD   Reason for Consultation: Hospice Care  Date of Admission:  7/8/2024    Patient Care Team:  Provider, No Known as PCP - Syed Amaro MD as Attending Provider (Hospice and Palliative Medicine)    Chief complaint: Senile degeneration of brain/Alzheimer's type dementia    History of present illness:  The patient is a 79 y.o. male who has known Alzheimer's type dementia who presented to the ED 7/8/2024, from home with increasing agitation.  The patient lives at home with his wife.  His wife can no longer care for him.  The patient was current with hospice upon admission to the ED.  The patient has had several falls.  CTs of the head and neck at those times were negative.  The patient was admitted as a hospice scattered bed patient.  I was asked to assume his care.    At the time of my evaluation, I reviewed with the patient's son at bedside.  With medications previously administered, the patient appears comfortable.  The patient was not awake and no ROS was obtainable.    Review of Systems  Review of systems could not be obtained due to   patient sedation status. patient unresponsive.    Palliative Performance Scale  Palliative Performance Scale Score: 30%  [place] Symptom Assessment System Revised  Pain Score: 4   ESAS Tiredness Score: 5  ESAS Nausea Score: No nausea  ESAS Depression Score: No depression  ESAS Anxiety Score: 4  ESAS Drowsiness Score: 5  ESAS Lack of Appetite Score: 2  ESAS Wellbeing Score: 3  ESAS Dyspnea Score: No shortness of breath  ESAS Other Problem Score: Best possible response  ESAS Source of Information: healthcare professional caregiver  ESAS Intervention: medicated/see MAR  ESAS Intervention Response: tolerated    History  Past Medical History:   Diagnosis Date    Hypertension     Sleep apnea    ,   Past Surgical History:   Procedure Laterality Date    PROSTATE SURGERY  2022   ,    Family History   Problem Relation Age of Onset    No Known Problems Mother     No Known Problems Father    , and   Social History     Socioeconomic History    Marital status:    Tobacco Use    Smoking status: Never     Passive exposure: Never    Smokeless tobacco: Never   Vaping Use    Vaping status: Never Used   Substance and Sexual Activity    Alcohol use: Never    Drug use: Never    Sexual activity: Defer     E-cigarette/Vaping    E-cigarette/Vaping Use Never User      E-cigarette/Vaping Substances     E-cigarette/Vaping Devices      Allergy Patient has no known allergies.    Vital Signs   Temp:  [96.1 °F (35.6 °C)-97.2 °F (36.2 °C)] 96.4 °F (35.8 °C)  Heart Rate:  [63-86] 70  Resp:  [16-18] 16  BP: ()/(62-94) 148/94  Device (Oxygen Therapy): room air SpO2:  [96 %-100 %] 98 %    Physical Exam:  General Appearance:   Not awake and appears in no acute distress lying on his back with head of bed elevated 20-30 degrees, chronically ill-appearing elderly male   Head:    Normocephalic, without obvious abnormality, atraumatic   Eyes:            Lids and lashes normal, conjunctivae and sclerae normal, no icterus   Ears:    Ears appear intact with no abnormalities noted   Throat:   No oral lesions, oral mucosa somewhat dry with mouth open    Neck:   No adenopathy, supple, trachea midline, no thyromegaly   Back:     No scoliosis present   Lungs:     Clear to auscultation, respirations regular and not labored    Heart:    Regular rhythm and normal rate       Abdomen:   Scaphoid, soft and non-tender, non-distended, loss of subcutaneous fat   Genitalia:    Deferred   Extremities:  No edema, upper and lower extremity muscle atrophy noted, pale and no cyanosis    Pulses:  Radial pulses palpable and equal bilaterally   Skin:   No bleeding         Neurologic:  Not awake to test      Results Review:   I reviewed the patient's new clinical results.    Impression:      Alzheimer's dementia, late onset, with behavioral  disturbance    Hospice care patient    HTN (hypertension)    Severe malnutrition    Cerebrovascular small vessel disease      Plan:  I reviewed the patient's admission and previous medical records including records in Care Everywhere.  I reviewed with the patient's son at bedside.  I examined the patient.  With medications previously administered, presently, the patient appears comfortable.  The patient had been prescribed Depakote sprinkles, olanzapine and Ativan tablet which he had been taken.  However, he has not taken none of these yet this morning.    The patient did require 1 dose 0.5 mg Dilaudid and 1 dose of 0.5 mg IV Ativan last evening, none thus far today.  Medications will be continued and adjusted as needed for symptom management for comfort.  No attempts at resuscitation will be made.      Syed Bravo MD  Hospice and Palliative Medicine  07/15/24  10:56 EDT

## 2024-07-15 NOTE — PROGRESS NOTES
Ac Green  [id number]  7/15/2024        Patient is a 80yo male with a primary [facility] diagnosis of alzheimer's. Admitted to [facility] for GIP for symptom management of pain, soa and terminal restlessness. Pt was living at home under hospitals services prior to admission     PPS: 10%             Symptom management  Medications in 24 hours:  -IV dialudid 0.5mg PRN x1  -PO ativan 0.5mg TID (none today as pt has not taken anything by mouth today)  -IV ativan 0.5mg PRN x1     Recommendations:  Continue to monitor for signs of decline and provide comfort measures. Contact [facility] at [phone] with any questions or concerns and at TOD.         Assessment:  Pt in bed with sitter at bedside. He is on his back and remained unresponsive during my assessment. Restraints have been removed. Per son he was able to eat a few bites of ice cream yesterday but has not woken up today. He is cooler to touch with palpable peripheral pulses. Lungs clear on ra. F/c patent with hortencia UOP and hypoactive b/s.      Collaboration:  Lenghtly discussion with son outside of the room. We discussed disease progression and the need for medications to managem his symptoms. He was very receptive to this information and appreciative of discussing in detail what's going on and why we are doing what we are doing. Encouraged him to call CS with any questions that may arise. He vu. Collaborated with facility RN about pts condition and recommendations.      Disposition:  Patient meets GIP criteria d/t frequent administration and continued titration of IV medications to achieve and maintain symptom management. Patient appears to be unsafe for transport at this time, requiring increasing symptom management and frequent RN assessment. If patient were to stabilize he would require LTC placement d/t increased daily care needs family and [facility] support. Will continue hospitals RN visits to monitor for changes, assess needs and provide  support. Please reach out with any questions/concerns. Thank you for allowing us the opportunity to participate in patient's care        Joceline Grace RN  [facility] Bed Team  [phone]

## 2024-07-16 NOTE — PLAN OF CARE
Goal Outcome Evaluation:  Plan of Care Reviewed With: daughter        Progress: declining  Outcome Evaluation: Restless at times. Patient unable to take evening po medications. Gave one dose of ativan 0..5mg IV last evening. Son and daughter at bedside assisting patient. Increasing secretions this shift. Will continue palliative measures.

## 2024-07-16 NOTE — PROGRESS NOTES
Ac Green  [id number]  7/16/2024        Patient is a 78yo male with a primary [facility] diagnosis of alzheimer's. Admitted to [facility] for GIP for symptom management of pain, soa and terminal restlessness and congesstion. Pt was living at home under hospitals services prior to admission     PPS: 10%             Symptom management  Medications in 24 hours:  -IV ativan 0.5mg PRN x2  -IV toradol 15mg PRN x2  -IV robinul 0.2mg PRN x1     Recommendations:  Continue to monitor for signs of decline and provide comfort measures. Contact [facility] at [phone] with any questions or concerns and at TOD.         Assessment:  Pt in bed with dtr and son at bedside. He was minimally responsive today with some facial grimacing during my assessment but quickly settled. His breathing was slightly labored on ra. Discussion outside of the room with pt son and dtr about medications and the possible need for something stronger than Toradol for respiratory symptoms. Time spent going over disease progression and offering support as well as discussing again why we medicate and treat s/s of distress in the dying phase. They denied any questions/concerns. Educated them on what to watch for and when to notify rn for medication needs.     Pt remains warm to touch with palpable peripheral pulses. F/c patent and very hypoactive b/s. Sitter and restraints have been removed.       Collaboration:  Lenghtly discussion with son and dtr outside of the room. We discussed disease progression and the need for medications to managem his symptoms. He was very receptive to this information and appreciative of discussing in detail what's going on and why we are doing what we are doing. Encouraged him to call CS with any questions that may arise. He vu. Collaborated with facility RN about pts condition and recommendations.      Disposition:  Patient meets GIP criteria d/t frequent administration and continued titration of IV medications to  achieve and maintain symptom management. Patient appears to be unsafe for transport at this time, requiring increasing symptom management and frequent RN assessment. If patient were to stabilize he would require LTC placement d/t increased daily care needs family and [facility] support. Will continue [facility] RN visits to monitor for changes, assess needs and provide support. Please reach out with any questions/concerns. Thank you for allowing us the opportunity to participate in patient's care        Joceline Grace RN  [facility] Bed Team  [phone]

## 2024-07-16 NOTE — PROGRESS NOTES
Palliative Care/Hospice Follow Up Note       LOS: 7 days   Patient Care Team:  Provider, No Known as PCP - Syed Amaro MD as Attending Provider (Hospice and Palliative Medicine)    Chief Complaint:  Senile degeneration of brain/Alzheimer's type dementia     Interval History:     Patient Complaints: None  Patient Denies: None  History taken from: Son and family and RN  Review of Systems:  As above.    Palliative Performance Scale  Palliative Performance Scale Score: 10%  [place] Symptom Assessment System Revised  Pain Score: no pain   ESAS Tiredness Score: 9  ESAS Nausea Score: No nausea  ESAS Depression Score: No depression  ESAS Anxiety Score: 5  ESAS Drowsiness Score: 9  ESAS Lack of Appetite Score: 5  ESAS Wellbeing Score: 5  ESAS Dyspnea Score: 5  ESAS Other Problem Score: 8  ESAS Source of Information: healthcare professional caregiver  ESAS Intervention: medicated/see MAR  ESAS Intervention Response: tolerated    Vital Signs  Temp:  [96 °F (35.6 °C)-98.9 °F (37.2 °C)] 96 °F (35.6 °C)  Heart Rate:  [] 81  Resp:  [16] 16  BP: ()/(52-77) 77/52  Device (Oxygen Therapy): room air SpO2:  [92 %-98 %] 92 %    Physical Exam:  General Appearance:    Not awake and appears in no acute distress lying on his back in bed with head of bed elevated 20-30 degrees, chronically ill-appearing elderly male   Throat:   No oral lesions, oral mucosa somewhat moist   Neck:   No adenopathy, supple, trachea midline   Lungs:     Clear to auscultation, respirations diminished and not labored, occasional pause noted    Heart:    Regular rhythm and normal rate   Abdomen:   Scaphoid, soft and non-tender, non-distended   Extremities:   No edema, pale and some cyanosis evident    Pulses:   Radial pulses palpable and equal bilaterally          Results Review:     I reviewed the patient's new clinical results.    Medication Reviewed.    Assessment & Plan       Alzheimer's dementia, late onset, with behavioral  disturbance    Hospice care patient    HTN (hypertension)    Severe malnutrition    Cerebrovascular small vessel disease      I reviewed with the patient's son and family at bedside.  With medications previously administered, patient appears more comfortable than he did yesterday.  The patient has been unable to take most of his p.o. medications.  If this continues, they will be discontinued tomorrow.  The patient has required glycopyrrolate for some airway congestion.  The patient has required 1 dose of 2 mg IV morphine and 1 dose of 15 mg IV Toradol.  Patient has also required 2 doses of 0.5 mg IV Ativan.  Medications will be continued and adjusted as needed for symptom management for comfort.  I answered questions asked.    Plan for disposition:  BOGDAN Bravo MD  Hospice and Palliative Medicine  07/16/24  14:32 EDT

## 2024-07-17 NOTE — PLAN OF CARE
Goal Outcome Evaluation:              Outcome Evaluation: Palliative pt, PPS 10% Saunders catheter in place. Room air. Family at bedside. No PRN given this shift. Resting comfortably with no signs of distress. Continue comfort measures.

## 2024-07-17 NOTE — PROGRESS NOTES
Ac Green  [id number]  7/17/2024        Patient is a 78yo male with a primary [facility] diagnosis of alzheimer's. Admitted to [facility] for GIP for symptom management of pain, soa and terminal restlessness and congesstion. Pt was living at home under \[facility]\"" services prior to admission     PPS: 20%             Symptom management  Medications in 24 hours:  -IV ativan 0.5mg PRN x1  -IV ativan 1mg PRN x2  -IV robinul 0.2mg PRN x0  -IV morphine 2mg PRN x2     Recommendations:  Continue to monitor for signs of decline and provide comfort measures. Contact [facility] at [phone] with any questions or concerns and at TOD.         Assessment:  Pt in bed minimally responsive. Per staff he is not tolerating PO medications. Currently IV medications are being given as needed and are proving effective. When nurse entered room pt has what appeared to be melted ice cream in his mouth that he was not swallowing. This nurse went to wipe pts mouth and he was able to spit out remaining food from his mouth. Son arrived to bedside and reported that pt was eating. This was discussed with son and risks of aspiration and education to make sure that pt is swallowing what is being given to him. He vu. This was passed on to facility RN and MD. Pt remains warm to touch with weaker peripheral pulses. Lungs clear on ra. Fc remains patent with hypoactive b/s        Collaboration:  Son joined this nurse in the room and declined estrellita questions/concerns and did not wantto discuss pt care/medications/disease progression today. Collaborated with facility RN about pts condition and recommendations.      Disposition:  Patient meets GIP criteria d/t frequent administration and continued titration of IV medications to achieve and maintain symptom management. Patient appears to be unsafe for transport at this time, requiring increasing symptom management and frequent RN assessment. If patient were to stabilize he would require LTC  placement d/t increased daily care needs family and [facility] support. Will continue [facility] RN visits to monitor for changes, assess needs and provide support. Please reach out with any questions/concerns. Thank you for allowing us the opportunity to participate in patient's care    Joceline Grace RN  [facility] Bed Team  [phone]

## 2024-07-17 NOTE — PROGRESS NOTES
Palliative Care/Hospice Follow Up Note       LOS: 8 days   Patient Care Team:  Provider, No Known as PCP - Syed Amaro MD as Attending Provider (Hospice and Palliative Medicine)    Chief Complaint:  Senile degeneration of brain/Alzheimer's type dementia     Interval History:     Patient Complaints: None  Patient Denies: None  History taken from: Son and RN  Review of Systems:  As above.    Palliative Performance Scale  Palliative Performance Scale Score: 10%  [place] Symptom Assessment System Revised  Pain Score: no pain   ESAS Tiredness Score: 5  ESAS Nausea Score: No nausea  ESAS Depression Score: No depression  ESAS Anxiety Score: 6  ESAS Drowsiness Score: 5  ESAS Lack of Appetite Score: 4  ESAS Wellbeing Score: 1  ESAS Dyspnea Score: No shortness of breath  ESAS Other Problem Score: Best possible response  ESAS Source of Information: healthcare professional caregiver  ESAS Intervention: medicated/see MAR  ESAS Intervention Response: tolerated    Vital Signs  Temp:  [97.9 °F (36.6 °C)] 97.9 °F (36.6 °C)  Heart Rate:  [70] 70  Resp:  [18] 18  BP: (114)/(67) 114/67  Device (Oxygen Therapy): room air SpO2:  [98 %] 98 %    Physical Exam:  General Appearance:    Not awake and appears in no acute distress lying on his back in bed with head of bed elevated 30-40 degrees, neck pillow in place, chronically ill-appearing elderly male   Throat:   No oral lesions, oral mucosa somewhat moist   Neck:   No adenopathy, supple, trachea midline   Lungs:     Clear to auscultation, respirations diminished and not labored, occasional pause noted    Heart:    Regular rhythm and normal rate   Abdomen:   Scaphoid, soft and non-tender, non-distended   Extremities:   No edema, pale and some cyanosis evident    Pulses:   Radial pulses palpable and equal bilaterally          Results Review:     I reviewed the patient's new clinical results.    Medication Reviewed.    Assessment & Plan       Alzheimer's dementia, late  onset, with behavioral disturbance    Hospice care patient    HTN (hypertension)    Severe malnutrition    Cerebrovascular small vessel disease      I reviewed with the patient's son at bedside.  The hospice RN reported and the son confirmed that when trying to feed, the patient bit the plastic spoon.  Presently, with medications previously administered, patient appears more comfortable than he did yesterday.  The patient has been unable to take most of his p.o. medications; they will be discontinued.  The patient has not required glycopyrrolate for some airway congestion.  The patient has required no IV morphine thus far today, 2 doses yesterday. Patient has required 2 doses of 1 mg IV Ativan, 3 doses yesterday.  Medications will be continued and adjusted as needed for symptom management for comfort.  I answered the patient's signs questions.    Plan for disposition:  BOGDAN Bravo MD  Hospice and Palliative Medicine  07/17/24  13:41 EDT

## 2024-07-17 NOTE — PLAN OF CARE
Goal Outcome Evaluation:  Plan of Care Reviewed With: patient, spouse, daughter, son           Outcome Evaluation: PPS 20%-10%. Family at bedside, tense relationships noted between the sons and pt's wife. Continued education provided and needed about end of life care, nutritional needs and medication management. Sons were asking earlier about IV fluids and feeding pt. Nsg spoke with family about end of life care and goals. They voiced understanding but more conversation is needed. IV to lft forearm and right upper ext, flushed and patent. F/C to bsd, PO medications have been d/c'd, PRN Ativan [id number] and Morphine x1 dose adminisered. Pt bedbound. Scant po intake.Nsg will cont with plan of care.

## 2024-07-18 NOTE — PLAN OF CARE
Goal Outcome Evaluation:  Plan of Care Reviewed With: patient, sibling, daughter, son           Outcome Evaluation: PPS 10%. Family supportive and at bedside. Morphine 2mg and Ativan 1mg administered per order. Order from Dr. Bravo to keep pt positioned on left or right side to help with airway clearance. IV to lft forearm and rt ac patent and saline locked. Nsg to cont with plan of care.

## 2024-07-18 NOTE — PLAN OF CARE
Goal Outcome Evaluation:  Plan of Care Reviewed With: patient, son        Progress: declining  Outcome Evaluation: Pt medicated with 2mg morphine and 1mg ativan x2.   Discussed the need for medication with son who remains at bedside.  Pt appears comfortable at this time.  Will continue to provide comfort measures as needed.

## 2024-07-18 NOTE — PROGRESS NOTES
Ac Green  [id number]  7/17/2024        Patient is a 78yo male with a primary [facility] diagnosis of alzheimer's. [facility] symptom management of pain, soa and terminal restlessness and congesstion. Pt was living at home under [facility] services prior to admission     PPS: 10%             Symptom management  Medications in 24 hours:  -IV ativan 2mg PRN x1  -IV ativan 1mg PRN x2  -IV robinul 0.2mg PRN x0  -IV morphine 2mg PRN x3     Recommendations:  Continue to monitor for signs of decline and provide comfort measures. Contact [facility] at [phone] with any questions or concerns and at TOD.         Assessment:  Pt in bed minimally responsive. During my assessment he did show some facial grimacing but other than that remained asleep. He occasionally will move in the bed and cough. He had not had any medications this shift and it was discussed with pt dtr and brother and they agreed to give him some medication to at least keep it in his system and ensure he remains comfortable.   He remains cool to touch with palpable peripheral pulses. F/c patent and hypoactive b/s. Lungs clear on ra and pt is no longer taking anything by mouth.     Yesterday when giving his medications on a spoon he bit the plastic spoon and it broke off in his mouth. It was recovered without issue by facility nurse. He was also pocketing anything that was put in his mouth. Family was educated on this. PO medications were stopped yesterday.        Collaboration:  Pt dtr and brother were at bedside. All questions/concerns were answered. Collaborated with facility RN about pts condition and recommendations.      Disposition:  Patient meets GIP criteria d/t frequent administration and continued titration of IV medications to achieve and maintain symptom management. Patient appears to be unsafe for transport at this time, requiring increasing symptom management and frequent RN assessment. If patient were to  stabilize he would require LTC placement d/t increased daily care needs family and [facility] support. Will continue [facility] RN visits to monitor for changes, assess needs and provide support. Please reach out with any questions/concerns. Thank you for allowing us the opportunity to participate in patient's care     Joceline Grace RN  [facility] Bed Team  [phone]

## 2024-07-18 NOTE — PROGRESS NOTES
Palliative Care/Hospice Follow Up Note       LOS: 9 days   Patient Care Team:  Provider, No Known as PCP - Syed Amaro MD as Attending Provider (Hospice and Palliative Medicine)    Chief Complaint:  Senile degeneration of brain/Alzheimer's type dementia     Interval History:     Patient Complaints: None  Patient Denies: None  History taken from: Son and family and RN  Review of Systems:  As above.    Palliative Performance Scale  Palliative Performance Scale Score: 10%  [place] Symptom Assessment System Revised  Pain Score: 4   ESAS Tiredness Score: 8  ESAS Nausea Score: No nausea  ESAS Depression Score: No depression  ESAS Anxiety Score: 3  ESAS Drowsiness Score: 8  ESAS Lack of Appetite Score: 8  ESAS Wellbeing Score: 1  ESAS Dyspnea Score: 1  ESAS Other Problem Score: Best possible response  ESAS Source of Information: healthcare professional caregiver  ESAS Intervention: medicated/see MAR  ESAS Intervention Response: tolerated    Vital Signs  Temp:  [96.5 °F (35.8 °C)] 96.5 °F (35.8 °C)  Heart Rate:  [62] 62  Resp:  [18] 18  BP: (110)/(69) 110/69  Device (Oxygen Therapy): room air      Physical Exam:  General Appearance:    Not awake and appears in no acute distress lying on his back in bed with head of bed elevated 30-40 degrees, neck pillow in place, chronically ill-appearing elderly male   Throat:   No oral lesions, oral mucosa somewhat moist   Neck:   No adenopathy, supple, trachea midline   Lungs:     Clear to auscultation, partially obstructed upper airway observed that improved with jaw thrust with coughing, respirations diminished and with slight increase inspiratory effort on his back     Heart:    Regular rhythm and normal rate   Abdomen:   Scaphoid, soft and non-tender, non-distended   Extremities:   No edema, pale and some cyanosis evident    Pulses:   Radial pulses palpable and equal bilaterally          Results Review:     I reviewed the patient's new clinical  results.    Medication Reviewed.    Assessment & Plan       Alzheimer's dementia, late onset, with behavioral disturbance    Hospice care patient    HTN (hypertension)    Severe malnutrition    Cerebrovascular small vessel disease      I reviewed with the patient's son and family at bedside.  Presently, with medications previously administered, patient appears more comfortable than he did yesterday.  However, he appears to be demonstrating partial obstructive upper airway while on his back.  This improved with jaw thrust.  The patient should be kept more on his sides.  The patient has not required glycopyrrolate for airway congestion.  The patient has required 2 doses of  mg IV morphine thus far today, 2 doses yesterday. Patient has required 2 doses of 1 mg IV Ativan, 4 doses yesterday.  Medications will be continued and adjusted as needed for symptom management for comfort.  I answered the patient's son's questions.  The patient's condition is worsening since gradual increase need medications noted    Plan for disposition:  BOGDAN Bravo MD  Hospice and Palliative Medicine  07/18/24  15:16 EDT

## 2024-07-19 NOTE — PLAN OF CARE
Goal Outcome Evaluation:  Plan of Care Reviewed With: patient        Progress: declining  Outcome Evaluation: Pt medicated with 2mg morphine and 1mg ativan x1.  Wife at bedside earlier.  No family overnight.  Pt positioned on left and right sides when turned.  Pt appears comfortable at this time.  Will continue to provide comfort measures as needed.

## 2024-07-19 NOTE — DOWNTIME EVENT NOTE
The EMR was down for 9 hours on 7/19/2024.    Radha Conner RN  was responsible for completing the paper charting during this time period.     The following information was re-entered into the system by Radha Conner RN: MAR    The following information will remain in the paper chart: shift assessments, flowchart      Radha Conner RN  7/19/2024

## 2024-07-19 NOTE — PROGRESS NOTES
Palliative Care/Hospice Follow Up Note       LOS: 10 days   Patient Care Team:  Provider, No Known as PCP - Syed Amaro MD as Attending Provider (Hospice and Palliative Medicine)    Chief Complaint:  Senile degeneration of brain/Alzheimer's type dementia     Interval History:     Patient Complaints: None  Patient Denies: None  History taken from: RN  Review of Systems:  As above.    Palliative Performance Scale  Palliative Performance Scale Score: 10%  [place] Symptom Assessment System Revised  Pain Score: no pain   ESAS Tiredness Score: 8  ESAS Nausea Score: No nausea  ESAS Depression Score: No depression  ESAS Anxiety Score: 1  ESAS Drowsiness Score: 9  ESAS Lack of Appetite Score: Worst lack of appetite  ESAS Wellbeing Score: 1  ESAS Dyspnea Score: 1  ESAS Other Problem Score: Best possible response  ESAS Source of Information: healthcare professional caregiver  ESAS Intervention: medicated/see MAR  ESAS Intervention Response: tolerated    Vital Signs  Temp:  [97.6 °F (36.4 °C)] 97.6 °F (36.4 °C)  Heart Rate:  [88] 88  Resp:  [18] 18  BP: (130)/(73) 130/73  Device (Oxygen Therapy): room air SpO2:  [92 %] 92 %    Physical Exam:  General Appearance:    Not awake and appears in no acute distress lying on his back in bed with head of bed elevated 30-40 degrees, neck pillow in place, chronically ill-appearing elderly male   Throat:   No oral lesions, oral mucosa somewhat moist   Neck:   No adenopathy, supple, trachea midline   Lungs:     Clear to auscultation, respirations diminished with slight increase inspiratory effort on his back, respirations slightly coarse     Heart:    Regular rhythm and normal rate   Abdomen:   Scaphoid, soft and non-tender, non-distended   Extremities:   No edema, pale and some cyanosis evident    Pulses:   Radial pulses palpable and equal bilaterally          Results Review:     I reviewed the patient's new clinical results.    Medication Reviewed.    Assessment & Plan        Alzheimer's dementia, late onset, with behavioral disturbance    Hospice care patient    HTN (hypertension)    Severe malnutrition    Cerebrovascular small vessel disease      No family present at the time of my evaluation. With medications previously administered, patient appears more comfortable than he did yesterday.  The patient has not required glycopyrrolate for airway congestion.  The patient has required 1 dose of  2 mg IV morphine thus far today, 3 doses yesterday. Patient has required 1 dose of 1 mg IV Ativan, 3 doses yesterday.  Medications will be continued and adjusted as needed for symptom management for comfort.  The patient's condition is worsening since gradual increase need medications noted    Plan for disposition:  B    Syed Bravo MD  Hospice and Palliative Medicine  07/19/24  18:27 EDT

## 2024-07-20 NOTE — PROGRESS NOTES
Palliative Care/Hospice Follow Up Note       LOS: 11 days   Patient Care Team:  Provider, No Known as PCP - Syed Amaro MD as Attending Provider (Hospice and Palliative Medicine)    Chief Complaint:  Senile degeneration of brain/Alzheimer's type dementia     Interval History:     Patient Complaints: None  Patient Denies: None  History taken from: RN  Review of Systems:  As above.    Palliative Performance Scale  Palliative Performance Scale Score: 10%  [place] Symptom Assessment System Revised  Pain Score: 6   ESAS Tiredness Score: 9  ESAS Nausea Score: No nausea  ESAS Depression Score: unable to assess  ESAS Anxiety Score: 4  ESAS Drowsiness Score: 9  ESAS Lack of Appetite Score: Worst lack of appetite  ESAS Wellbeing Score: unable to assess  ESAS Dyspnea Score: 2  ESAS Other Problem Score: 3 (congestion)  ESAS Source of Information: healthcare professional caregiver  ESAS Intervention: medicated/see MAR  ESAS Intervention Response: tolerated    Vital Signs  Temp:  [97.5 °F (36.4 °C)-97.6 °F (36.4 °C)] 97.6 °F (36.4 °C)  Heart Rate:  [81-83] 81  Resp:  [18] 18  BP: (89-94)/(49-51) 94/51  Device (Oxygen Therapy): room air SpO2:  [51 %-79 %] 51 %    Physical Exam:  General Appearance:    Not awake and appears in no acute distress lying slightly on his right side with head of bed elevated 20-30 degrees, chronically ill-appearing elderly male   Throat:   No oral lesions, oral mucosa somewhat moist   Neck:   No adenopathy, supple, trachea midline   Lungs:     Clear to auscultation, respirations diminished with slight increase inspiratory and respirations slightly coarse     Heart:    Regular rhythm and normal rate   Abdomen:   Scaphoid, soft and non-tender, non-distended   Extremities:   No edema, pale and cyanosis evident    Pulses:   Radial pulses palpable and equal bilaterally          Results Review:     I reviewed the patient's new clinical results.    Medication Reviewed.    Assessment & Plan        Alzheimer's dementia, late onset, with behavioral disturbance    Hospice care patient    HTN (hypertension)    Severe malnutrition    Cerebrovascular small vessel disease      No family present at the time of my evaluation.  I reviewed with the patient's RN.  With medications previously administered, patient appears more comfortable than he did yesterday.  The patient has required glycopyrrolate for airway congestion.  The patient has required 1 dose of  4 mg IV morphine thus far today, 4 doses yesterday. Patient has required 1 dose of 1 mg IV Ativan, 4 doses yesterday.  Medications will be continued and adjusted as needed for symptom management for comfort.  The patient's condition is worsening since gradual increase medications needed    Plan for disposition:  BOGDAN Bravo MD  Hospice and Palliative Medicine  07/20/24  08:30 EDT

## 2024-07-20 NOTE — NURSING NOTE
Review of visit: patient is an 80 YO M with a primary diagnosis of Alzheimer's. Admitted to [facility] for GIP for mgmt. of Pain, SOA, anxiety/agitation, congestion & EOL & symptom mgmt. care.   Isolation: NA  PPS: 10%  Medications in last 24 hours: IV Morphine 2mg x2, increased to IV Morphine 4mg x3 IV Ativan 1mg x5, IV Robinul 0.2mg x3  Recommendations:   Continue to monitor for signs of decline & provide comfort measures. Contact [facility] with any questions/concerns & at TOD.   Assessment:   Patient is lying on side, non-responsive, RR even slightly shallow & bradypneic, lungs D BS absent, pedals PW with trace edema. Mottling noted on soles of feet/bottom of toes, nailbeds & fingers cyanotic. Skin is slightly ashen, hands are cool to touch but patient is otherwise warm. FC in place with dark hortencia concentrated urine noted in bag. Daughter at bedside, discussed overall decline, reviewed patient is actively dying & appears imminent but looks comfortable. Daughter V/U.   Collaboration:   Spoke with family at bedside, condition update provided including disease progression, symptom mgmt. & patient's condition, training provided. Family V/U of patient's condition and all questions answered. Updated staff RN Eunice, recommendations provided as appropriate.   Disposition:  Patient meets GIP criteria d/t frequent administration & continued titration of IV meds to achieve/maintain symptom mgmt.. Patient appears unstable for transport, requiring increasing symptom mgmt. & frequent nursing interventions. If patient stabilizes & needs to transition to a lower level of care, placement may be needed due to increased daily care needs. Will continue [facility] RN visits to monitor for changes, assess needs & provide support.   Please reach out with any questions/concerns. Thank you for allowing us the opportunity to participate in patient's care.     Sana Dacosta RN, BSN  Scatter Bed Team  [facility]

## 2024-07-20 NOTE — PLAN OF CARE
Goal Outcome Evaluation:  Plan of Care Reviewed With: spouse, family        Progress: declining  Outcome Evaluation: Pt with increased restlessness and moaning at start of shift. Increased morphine to 4mg and ativan and robinul also given. Pt rested well after med increased. Medicated once more this shift when pt again with increased moaning and restlessness. Family at bedside, aware of pt declining condition. Will monitor for comfort.

## 2024-07-20 NOTE — PLAN OF CARE
Goal Outcome Evaluation:  Plan of Care Reviewed With: patient, family        Progress: declining  Outcome Evaluation: PRN 4mg morphine, 1mg ativan, and 0.2mg robinul given x1 thusfar this shift. Pt resting comfortably at this time. Family at bedside. Increased mottling noted, RN educated family on s/s of decline. Respirations slowing throughout shift, still regular pattern. Will continue to monitor and provide palliative care.    1527--Pt grimacing and moaning with 1700 turn, comfort meds given, pt may benefit from premedication prior to turns.                           Problem: Malnutrition  Goal: Improved Nutritional Intake  Outcome: Unable to Meet, Plan Revised

## 2024-07-21 NOTE — PLAN OF CARE
Goal Outcome Evaluation:      Pt maintains PPS 10%; minimally responsive to pain. RN spoke with family whom is agreeable with premedication schedule for pt comfort. PRNs: 1mg dilaudid, 2mg ativan and 0.2 robinul X4 this shift. Family bs; pt appears to be resting comfortably. Will continue plan of comfort.

## 2024-07-21 NOTE — PLAN OF CARE
Goal Outcome Evaluation:  Plan of Care Reviewed With: family        Progress: declining  Outcome Evaluation: Pt responsive to pain. Medicated PRN x1 with morphine and ativan with improved symptom management noted of pain and restlessness. Turned side to side, no congestion noted over night. Will cotninue to monitor for comfort.

## 2024-07-21 NOTE — PROGRESS NOTES
Palliative Care/Hospice Follow Up Note       LOS: 12 days   Patient Care Team:  Provider, No Known as PCP - Syed Amaro MD as Attending Provider (Hospice and Palliative Medicine)    Chief Complaint:  Senile degeneration of brain/Alzheimer's type dementia     Interval History:     Patient Complaints: None  Patient Denies: None  History taken from: Brother and RN  Review of Systems:  As above.    Palliative Performance Scale  Palliative Performance Scale Score: 10%  [place] Symptom Assessment System Revised  Pain Score: no pain   ESAS Tiredness Score: Worst possible tiredness  ESAS Nausea Score: No nausea  ESAS Depression Score: unable to assess  ESAS Anxiety Score: No anxiety  ESAS Drowsiness Score: Worst possible drowsiness  ESAS Lack of Appetite Score: Worst lack of appetite  ESAS Wellbeing Score: unable to assess  ESAS Dyspnea Score: No shortness of breath  ESAS Other Problem Score: 1  ESAS Source of Information: healthcare professional caregiver  ESAS Intervention: medicated/see MAR  ESAS Intervention Response: tolerated    Vital Signs  Temp:  [97.1 °F (36.2 °C)-97.3 °F (36.3 °C)] 97.1 °F (36.2 °C)  Heart Rate:  [75-78] 78  Resp:  [8-10] 8  BP: (81-82)/(53-56) 82/56  Device (Oxygen Therapy): room air SpO2:  [88 %-89 %] 88 %    Physical Exam:  General Appearance:    Not awake and appears in no acute distress lying slightly on his right side with head of bed elevated 20-30 degrees, chronically ill-appearing elderly male   Throat:   No oral lesions, oral mucosa somewhat moist   Neck:   No adenopathy, supple, trachea midline   Lungs:     Clear to auscultation, respirations diminished with slight increase inspiratory effort and respirations slightly coarse     Heart:    Regular rhythm and normal rate   Abdomen:   Scaphoid, soft and non-tender, non-distended   Extremities:   No edema, pale and some cyanosis evident    Pulses:   Radial pulses palpable and equal bilaterally          Results Review:      I reviewed the patient's new clinical results.    Medication Reviewed.    Assessment & Plan       Alzheimer's dementia, late onset, with behavioral disturbance    Hospice care patient    HTN (hypertension)    Severe malnutrition    Cerebrovascular small vessel disease      No family present at the time of my evaluation.  I reviewed with the patient's RN.  Subsequently, I saw the patient's brother in the hallway and I reviewed the patient's course.  With medications previously administered, patient appears comfortable.  The patient has required glycopyrrolate for airway congestion.  The patient has required 1 dose of  4 mg IV morphine thus far today, 3 doses yesterday. Patient has required 1 dose of 1 mg IV Ativan, 3 doses yesterday.  Medications will be continued and adjusted as needed for symptom management for comfort.  The patient's condition is gradually declining.  The patient's brother is aware of this.      Plan for disposition:  BOGDAN Bravo MD  Hospice and Palliative Medicine  07/21/24  08:24 EDT

## 2024-07-22 NOTE — CASE MANAGEMENT/SOCIAL WORK
Case Management Discharge Note      Final Note: Patient  . Deysi COX         Selected Continued Care - Discharged on 2024 Admission date: 2024 - Discharge disposition:       Destination Coordination complete.      Service Provider Selected Services Address Phone Fax Patient Preferred    [facility] [address], [facility] [address] [phone] --              Durable Medical Equipment    No services have been selected for the patient.                Dialysis/Infusion    No services have been selected for the patient.                Home Medical Care    No services have been selected for the patient.                Therapy    No services have been selected for the patient.                Community Resources    No services have been selected for the patient.                Community & DME    No services have been selected for the patient.                         Final Discharge Disposition Code: [id number] -

## 2024-07-22 NOTE — DISCHARGE SUMMARY
Discharge As      Date of Admisssion:  2024  Date of Death:  2024  Time of Death:  3:10 AM    Patient Care Team:  Provider, No Known as PCP - Syed Amaro MD as Attending Provider (Hospice and Palliative Medicine)    Final Diagnosis:     Alzheimer's dementia, late onset, with behavioral disturbance    Hospice care patient    HTN (hypertension)    Severe malnutrition    Cerebrovascular small vessel disease      Hospital Course  Patient was a 79 y.o. male who has known Alzheimer's type dementia who presented to the ED 2024, from home with increasing agitation. The patient lives at home with his wife. His wife can no longer care for him. The patient was current with hospice upon admission to the ED. The patient has had several falls. CTs of the head and neck at those times were negative. The patient was admitted as a hospice scattered bed patient.  Medications were provided and adjusted as needed for symptom management for comfort.  I was able to discussed with the patient's son and our brother during the entire admission.  Gradual decline noted over the last several days.  Earlier this a.m., I was called that the patient's respirations ceased and no pulse palpable. No heart sounds audible. I pronounced the patient at 0310 hours.      Syed Bravo MD  Hospice and Palliative Medicine  24  08:47 EDT

## 2024-07-23 NOTE — PROGRESS NOTES
Case Management Discharge Note      Final Note: The patient  on 24 @ 03:10. BLucas Diaz RN, CCP.         Selected Continued Care - Discharged on 2024 Admission date: 2024 - Discharge disposition:       Destination Coordination complete.      Service Provider Selected Services Address Phone Fax Patient Pref[facility]    [facility] [address], [facility] [address] [phone] --              Durable Medical Equipment    No services have been selected for the patient.                Dialysis/Infusion    No services have been selected for the patient.                Home Medical Care    No services have been selected for the patient.                Therapy    No services have been selected for the patient.                Community Resources    No services have been selected for the patient.                Community & DME    No services have been selected for the patient.                         Final Discharge Disposition Code: 41 -  in medical facility